# Patient Record
Sex: MALE | Race: WHITE | Employment: OTHER | URBAN - METROPOLITAN AREA
[De-identification: names, ages, dates, MRNs, and addresses within clinical notes are randomized per-mention and may not be internally consistent; named-entity substitution may affect disease eponyms.]

---

## 2017-07-03 ENCOUNTER — OFFICE VISIT (OUTPATIENT)
Dept: FAMILY MEDICINE CLINIC | Age: 57
End: 2017-07-03

## 2017-07-03 VITALS
HEART RATE: 62 BPM | OXYGEN SATURATION: 98 % | WEIGHT: 247 LBS | DIASTOLIC BLOOD PRESSURE: 82 MMHG | BODY MASS INDEX: 37.01 KG/M2 | SYSTOLIC BLOOD PRESSURE: 134 MMHG

## 2017-07-03 DIAGNOSIS — S90.222A SUBUNGUAL HEMATOMA OF FOOT, LEFT, INITIAL ENCOUNTER: Primary | ICD-10-CM

## 2017-07-03 PROCEDURE — 99212 OFFICE O/P EST SF 10 MIN: CPT | Performed by: FAMILY MEDICINE

## 2017-09-14 ENCOUNTER — TELEPHONE (OUTPATIENT)
Dept: FAMILY MEDICINE CLINIC | Age: 57
End: 2017-09-14

## 2017-09-14 DIAGNOSIS — Z00.00 EXAMINATION, MEDICAL, GENERAL: ICD-10-CM

## 2017-09-14 DIAGNOSIS — E78.5 HYPERLIPIDEMIA, UNSPECIFIED HYPERLIPIDEMIA TYPE: ICD-10-CM

## 2017-09-14 DIAGNOSIS — E03.9 HYPOTHYROIDISM, UNSPECIFIED TYPE: Primary | ICD-10-CM

## 2017-09-14 DIAGNOSIS — Z12.11 SCREEN FOR COLON CANCER: ICD-10-CM

## 2017-09-21 LAB
A/G RATIO: 1.1 (ref 1–2)
ALBUMIN SERPL-MCNC: 7.2 G/DL (ref 6.4–8.2)
ALBUMIN SERUM: 3.7 G/DL (ref 3.4–5)
ALP BLD-CCNC: 79 U/L (ref 45–117)
ALT SERPL-CCNC: 27 U/L (ref 12–78)
ANION GAP SERPL CALCULATED.3IONS-SCNC: 5 MMOL/L (ref 7–16)
AST SERPL-CCNC: 15 U/L (ref 15–37)
BILIRUBIN: 0.5 MG/DL (ref 0.2–1)
BUN BLDV-MCNC: 12 MG/DL (ref 7–18)
CALCIUM SERPL-MCNC: 8.9 MG/DL (ref 8.5–10.1)
CHLORIDE BLD-SCNC: 106 MMOL/L (ref 98–107)
CHOLESTEROL, STONE: 200 MG/DL
CO2: 26 MMOL/L (ref 21–32)
CREATININE + EGFR PANEL: 1.2 MG/DL (ref 0.6–1.3)
GFR AFRICAN AMERICAN: > 60 ML/MIN/1.73M2
GFR NON-AFRICAN AMERICAN: > 60 ML/MIN/1.73M2
GLOBULIN: 3.5 G/DL (ref 2.6–4.2)
GLUCOSE: 85 MG/DL (ref 74–106)
HDLC SERPL-MCNC: 45 MG/DL (ref 40–60)
LDL CHOLESTEROL CALCULATED: 120 MG/DL (ref 0–99)
POTASSIUM SERPL-SCNC: 3.7 MMOL/L (ref 3.5–5.1)
PROSTATE SPECIFIC ANTIGEN PERCENT FREE: 5.41 NG/ML (ref 0.01–4)
SODIUM BLD-SCNC: 137 MMOL/L (ref 136–145)
TRIGL SERPL-MCNC: 176 MG/DL (ref 0–149)
TSH SERPL DL<=0.05 MIU/L-ACNC: 1.67 UIU/ML (ref 0.36–3.74)
VLDLC SERPL CALC-MCNC: 35 MG/DL (ref 0–40)

## 2017-09-25 RX ORDER — LEVOTHYROXINE SODIUM 112 UG/1
TABLET ORAL
Qty: 30 TABLET | Refills: 0 | Status: SHIPPED | OUTPATIENT
Start: 2017-09-25 | End: 2017-10-03 | Stop reason: SDUPTHER

## 2017-10-03 ENCOUNTER — OFFICE VISIT (OUTPATIENT)
Dept: FAMILY MEDICINE CLINIC | Age: 57
End: 2017-10-03

## 2017-10-03 VITALS
SYSTOLIC BLOOD PRESSURE: 120 MMHG | WEIGHT: 249 LBS | DIASTOLIC BLOOD PRESSURE: 88 MMHG | OXYGEN SATURATION: 98 % | HEART RATE: 67 BPM | BODY MASS INDEX: 36.88 KG/M2 | HEIGHT: 69 IN

## 2017-10-03 DIAGNOSIS — E78.5 HYPERLIPIDEMIA, UNSPECIFIED HYPERLIPIDEMIA TYPE: ICD-10-CM

## 2017-10-03 DIAGNOSIS — Z23 NEED FOR INFLUENZA VACCINATION: ICD-10-CM

## 2017-10-03 DIAGNOSIS — K21.9 GASTROESOPHAGEAL REFLUX DISEASE WITHOUT ESOPHAGITIS: ICD-10-CM

## 2017-10-03 DIAGNOSIS — N40.1 BENIGN PROSTATIC HYPERPLASIA WITH LOWER URINARY TRACT SYMPTOMS, UNSPECIFIED MORPHOLOGY: ICD-10-CM

## 2017-10-03 DIAGNOSIS — R97.20 ELEVATED PSA: ICD-10-CM

## 2017-10-03 DIAGNOSIS — Z00.00 WELL ADULT EXAM: Primary | ICD-10-CM

## 2017-10-03 DIAGNOSIS — E03.9 HYPOTHYROIDISM, UNSPECIFIED TYPE: ICD-10-CM

## 2017-10-03 PROCEDURE — 90630 INFLUENZA, QUADV, 18-64 YRS, ID, PF, MICRO INJ, 0.1ML (FLUZONE QUADV, PF): CPT | Performed by: FAMILY MEDICINE

## 2017-10-03 PROCEDURE — 90471 IMMUNIZATION ADMIN: CPT | Performed by: FAMILY MEDICINE

## 2017-10-03 PROCEDURE — 99396 PREV VISIT EST AGE 40-64: CPT | Performed by: FAMILY MEDICINE

## 2017-10-03 RX ORDER — FLUTICASONE PROPIONATE 50 MCG
2 SPRAY, SUSPENSION (ML) NASAL DAILY
Qty: 3 BOTTLE | Refills: 3 | Status: SHIPPED | OUTPATIENT
Start: 2017-10-03 | End: 2018-10-18 | Stop reason: SDUPTHER

## 2017-10-03 RX ORDER — PANTOPRAZOLE SODIUM 40 MG/1
40 TABLET, DELAYED RELEASE ORAL DAILY
Qty: 90 TABLET | Refills: 3 | Status: SHIPPED | OUTPATIENT
Start: 2017-10-03 | End: 2018-10-18 | Stop reason: SDUPTHER

## 2017-10-03 RX ORDER — LEVOTHYROXINE SODIUM 112 UG/1
TABLET ORAL
Qty: 90 TABLET | Refills: 3 | Status: SHIPPED | OUTPATIENT
Start: 2017-10-03 | End: 2018-09-25 | Stop reason: SDUPTHER

## 2017-10-03 RX ORDER — ATORVASTATIN CALCIUM 20 MG/1
20 TABLET, FILM COATED ORAL DAILY
Qty: 30 TABLET | Refills: 12 | Status: SHIPPED | OUTPATIENT
Start: 2017-10-03 | End: 2018-10-18 | Stop reason: SDUPTHER

## 2017-10-03 ASSESSMENT — PATIENT HEALTH QUESTIONNAIRE - PHQ9
SUM OF ALL RESPONSES TO PHQ QUESTIONS 1-9: 0
SUM OF ALL RESPONSES TO PHQ9 QUESTIONS 1 & 2: 0
2. FEELING DOWN, DEPRESSED OR HOPELESS: 0
1. LITTLE INTEREST OR PLEASURE IN DOING THINGS: 0

## 2017-10-03 NOTE — MR AVS SNAPSHOT
After Visit Summary             Brenton Hopper   10/3/2017 10:40 AM   Office Visit    Description:  Male : 1960   Provider:  Fabiola Metz MD   Department:  [place] [address] and Future Appointments         Below is a list of your follow-up and future appointments. This may not be a complete list as you may have made appointments directly with providers that we are not aware of or your providers may have made some for you. Please call your providers to confirm appointments. It is important to keep your appointments. Please bring your current insurance card, photo ID, co-pay, and all medication bottles to your appointment. If self-pay, payment is expected at the time of service. Your To-Do List     Follow-Up    Return in about 1 year (around 10/3/2018) for Well. Information from Your Visit        Department     Name Address Phone Fax    [address] [phone]      You Were Seen for:         Comments    Well adult exam   [[id number]]         Vital Signs     Blood Pressure Pulse Height Weight Oxygen Saturation Body Mass Index    120/88 (Site: Left Arm, Position: Sitting, Cuff Size: Large Adult) 79 5' 8.5\" (1.74 m) 249 lb (112.9 kg) 98% 37.31 kg/m2    Smoking Status                   Current Some Day Smoker           Additional Information about your Body Mass Index (BMI)           Your BMI as listed above is considered obese (30 or more). BMI is an estimate of body fat, calculated from your height and weight. The higher your BMI, the greater your risk of heart disease, high blood pressure, type 2 diabetes, stroke, gallstones, arthritis, sleep apnea, and certain cancers. BMI is not perfect. It may overestimate body fat in athletes and people who are more muscular.   Even a small weight loss (between 5 and 10 percent of your current weight) by decreasing your calorie intake and [address]  [phone], Christine Elijah.   Dr. [facility]  [address]  [facility], [address], [place], [address] [place] [address]  [phone]      Urology  [facility]  Dr. Sheryle Sorenson Dr. Angelique Bruins Dr. Susen Pimenta Dr. Della Sickles (female)  Dr. Meera Webber  [phone]                  Today's Medication Changes          These changes are accurate as of: 10/3/17 11:57 AM.  If you have any questions, ask your nurse or doctor. START taking these medications           atorvastatin 20 MG tablet   Commonly known as:  LIPITOR   Instructions: Take 1 tablet by mouth daily   Quantity:  30 tablet   Refills:  12   Started by:  Anthony Myers MD         CHANGE how you take these medications           levothyroxine 112 MCG tablet   Commonly known as:  SYNTHROID   Instructions:  TAKE ONE TABLET BY MOUTH DAILY   Quantity:  90 tablet   Refills:  3   What changed:  See the new instructions.    Changed by:  Anthony Myers MD            Where to Get Your Medications      These medications were sent to [address], [address] [place] [address] [phone]  [address] [place] [address] [id number] [place] [address],[address] [address]     Phone:  [phone]     atorvastatin 20 MG tablet    levothyroxine 112 MCG tablet         You can get these medications from any pharmacy     Bring a paper prescription for each of these medications     Cetirizine HCl 10 MG Caps    fluticasone 50 MCG/ACT nasal spray    pantoprazole 40 MG tablet               Your Current Medications Are              levothyroxine (SYNTHROID) 112 MCG tablet TAKE ONE TABLET BY MOUTH DAILY    pantoprazole (PROTONIX) 40 MG tablet Take 1 tablet by mouth daily    Cetirizine HCl (ZYRTEC ALLERGY) 10 MG CAPS Take 10 mg by mouth daily as needed (allergies)    fluticasone (FLONASE) 50 MCG/ACT nasal spray 2 sprays by Nasal route daily Both nostrils

## 2017-10-03 NOTE — PROGRESS NOTES
Subjective:      Patient ID: Edi Rhodes is a 65 y.o. male. HPI patient presents today for his annual physical.        Hasn't been taking protonix for a year. Started with heartburn sx again about a month after stopped it, eating more rolaids. Taking thyroid reg, no issues. Gets up 4 times a night to urinate. Some hesitency with stream. Not bothered by it. Has been taking more dayquil for allergies, was getting more HA but dayquil helped with the drainage and HA better as well. Patient's medications, allergies, past medical, surgical, social and family histories were reviewed and updated in the EHR as appropriate. Not exercising as much as basement tore up but he is refinishing it so staying active there. UTD with dental and eye. Review of Systems    Objective:   Physical Exam      Body mass index is 37.31 kg/(m^2). Vitals:    10/03/17 1040   BP: 120/88   Site: Left Arm   Position: Sitting   Cuff Size: Large Adult   Pulse: 67   SpO2: 98%   Weight: 249 lb (112.9 kg)   Height: 5' 8.5\" (1.74 m)     Wt Readings from Last 3 Encounters:   10/03/17 249 lb (112.9 kg)   07/03/17 247 lb (112 kg)   09/01/16 231 lb (104.8 kg)       GENERAL:Alert and oriented x 4 NAD, obese, well hydrated, well developed. NECK:supple and non tender without mass, no thyromegaly or thyroid nodules, no cervical lymphadenopathy  LUNG:clear to auscultation bilaterally with normal respiratory effort  CV: Normal heart sounds, regular rate and rhythm without murmurs  EXTREMETY: no loss of hair, no edema, normal pedal pulses bilaterally    PHQ-9 Total Score: 0 (10/3/2017 10:44 AM)      Assessment:         Juan Pablo Storm was seen today for annual exam.    Diagnoses and all orders for this visit:    Well adult exam  Healthy diet, importance of reg exercise discussed    Hypothyroidism, unspecified type  -Stable, continue current medications.     Hyperlipidemia, unspecified hyperlipidemia type  Start lipitor  10 year CV risk 10.8%,

## 2018-09-25 ENCOUNTER — TELEPHONE (OUTPATIENT)
Dept: FAMILY MEDICINE CLINIC | Age: 58
End: 2018-09-25

## 2018-09-25 DIAGNOSIS — Z51.81 THERAPEUTIC DRUG MONITORING: ICD-10-CM

## 2018-09-25 DIAGNOSIS — E78.5 HYPERLIPIDEMIA, UNSPECIFIED HYPERLIPIDEMIA TYPE: ICD-10-CM

## 2018-09-25 DIAGNOSIS — E03.9 HYPOTHYROIDISM, UNSPECIFIED TYPE: Primary | ICD-10-CM

## 2018-09-25 RX ORDER — LEVOTHYROXINE SODIUM 112 UG/1
TABLET ORAL
Qty: 90 TABLET | Refills: 0 | Status: SHIPPED | OUTPATIENT
Start: 2018-09-25 | End: 2018-10-18 | Stop reason: SDUPTHER

## 2018-10-18 ENCOUNTER — OFFICE VISIT (OUTPATIENT)
Dept: FAMILY MEDICINE CLINIC | Age: 58
End: 2018-10-18
Payer: COMMERCIAL

## 2018-10-18 VITALS
HEIGHT: 68 IN | OXYGEN SATURATION: 97 % | DIASTOLIC BLOOD PRESSURE: 80 MMHG | WEIGHT: 251 LBS | HEART RATE: 60 BPM | BODY MASS INDEX: 38.04 KG/M2 | SYSTOLIC BLOOD PRESSURE: 118 MMHG

## 2018-10-18 DIAGNOSIS — E78.5 HYPERLIPIDEMIA, UNSPECIFIED HYPERLIPIDEMIA TYPE: ICD-10-CM

## 2018-10-18 DIAGNOSIS — E03.9 HYPOTHYROIDISM, UNSPECIFIED TYPE: ICD-10-CM

## 2018-10-18 DIAGNOSIS — R97.20 ELEVATED PSA: ICD-10-CM

## 2018-10-18 DIAGNOSIS — K63.5 POLYP OF COLON, UNSPECIFIED PART OF COLON, UNSPECIFIED TYPE: ICD-10-CM

## 2018-10-18 DIAGNOSIS — K21.9 GASTROESOPHAGEAL REFLUX DISEASE WITHOUT ESOPHAGITIS: ICD-10-CM

## 2018-10-18 DIAGNOSIS — Z00.00 WELL ADULT EXAM: Primary | ICD-10-CM

## 2018-10-18 PROCEDURE — 99396 PREV VISIT EST AGE 40-64: CPT | Performed by: FAMILY MEDICINE

## 2018-10-18 RX ORDER — PANTOPRAZOLE SODIUM 40 MG/1
40 TABLET, DELAYED RELEASE ORAL DAILY
Qty: 90 TABLET | Refills: 3 | Status: SHIPPED | OUTPATIENT
Start: 2018-10-18 | End: 2019-10-31 | Stop reason: SDUPTHER

## 2018-10-18 RX ORDER — LEVOTHYROXINE SODIUM 112 UG/1
TABLET ORAL
Qty: 90 TABLET | Refills: 3 | Status: SHIPPED | OUTPATIENT
Start: 2018-10-18 | End: 2019-10-31 | Stop reason: SDUPTHER

## 2018-10-18 RX ORDER — ATORVASTATIN CALCIUM 20 MG/1
20 TABLET, FILM COATED ORAL DAILY
Qty: 90 TABLET | Refills: 3 | Status: SHIPPED | OUTPATIENT
Start: 2018-10-18 | End: 2018-11-26

## 2018-10-18 RX ORDER — FLUTICASONE PROPIONATE 50 MCG
2 SPRAY, SUSPENSION (ML) NASAL DAILY
Qty: 3 BOTTLE | Refills: 3 | Status: SHIPPED | OUTPATIENT
Start: 2018-10-18 | End: 2019-10-31 | Stop reason: CLARIF

## 2018-10-18 ASSESSMENT — PATIENT HEALTH QUESTIONNAIRE - PHQ9
SUM OF ALL RESPONSES TO PHQ9 QUESTIONS 1 & 2: 0
SUM OF ALL RESPONSES TO PHQ QUESTIONS 1-9: 0
1. LITTLE INTEREST OR PLEASURE IN DOING THINGS: 0
SUM OF ALL RESPONSES TO PHQ QUESTIONS 1-9: 0
2. FEELING DOWN, DEPRESSED OR HOPELESS: 0

## 2018-10-18 NOTE — PATIENT INSTRUCTIONS
Call and schedule your colonoscopy  Dr. Salcido Offer  [phone]      Patient Education        Learning About Low-Carbohydrate Diets for Weight Loss  What is a low-carbohydrate diet? Low-carb diets avoid foods that are high in carbohydrate. These high-carb foods include pasta, bread, rice, cereal, fruits, and starchy vegetables. Instead, these diets usually have you eat foods that are high in fat and protein. Many people lose weight quickly on a low-carb diet. But the early weight loss is water. People on this diet often gain the weight back after they start eating carbs again. Not all diet plans are safe or work well. A lot of the evidence shows that low-carb diets aren't healthy. That's because these diets often don't include healthy foods like fruits and vegetables. Losing weight safely means balancing protein, fat, and carbs with every meal and snack. And low-carb diets don't always provide the vitamins, minerals, and fiber you need. If you have a serious medical condition, talk to your doctor before you try any diet. These conditions include kidney disease, heart disease, type 2 diabetes, high cholesterol, and high blood pressure. If you are pregnant, it may not be safe for your baby if you are on a low-carb diet. How can you lose weight safely? You might have heard that a diet plan helped another person lose weight. But that doesn't mean that it will work for you. It is very hard to stay on a diet that includes lots of big changes in your eating habits. If you want to get to a healthy weight and stay there, making healthy lifestyle changes will often work better than dieting. These steps can help. · Make a plan for change. Work with your doctor to create a plan that is right for you. · See a dietitian. He or she can show you how to make healthy changes in your eating habits. · Manage stress.  If you have a lot of stress in your life, it can be hard to focus on making healthy changes to your daily

## 2018-10-23 LAB — PROSTATE SPECIFIC ANTIGEN PERCENT FREE: 6.16 NG/ML (ref 0.01–4)

## 2018-10-24 ENCOUNTER — TELEPHONE (OUTPATIENT)
Dept: FAMILY MEDICINE CLINIC | Age: 58
End: 2018-10-24

## 2018-10-25 ENCOUNTER — TELEPHONE (OUTPATIENT)
Dept: FAMILY MEDICINE CLINIC | Age: 58
End: 2018-10-25

## 2018-10-25 DIAGNOSIS — R97.20 ELEVATED PSA: Primary | ICD-10-CM

## 2018-10-25 NOTE — TELEPHONE ENCOUNTER
Patient already has an appointment with Dr. Raffy Begum, referral has been placed in [facility] and faxed to [facility].

## 2018-11-26 RX ORDER — ATORVASTATIN CALCIUM 20 MG/1
TABLET, FILM COATED ORAL
Qty: 90 TABLET | Refills: 1 | Status: SHIPPED | OUTPATIENT
Start: 2018-11-26 | End: 2019-10-31 | Stop reason: CLARIF

## 2018-12-14 ENCOUNTER — OFFICE VISIT (OUTPATIENT)
Dept: FAMILY MEDICINE CLINIC | Age: 58
End: 2018-12-14
Payer: COMMERCIAL

## 2018-12-14 VITALS
TEMPERATURE: 98.2 F | SYSTOLIC BLOOD PRESSURE: 127 MMHG | OXYGEN SATURATION: 98 % | HEART RATE: 62 BPM | WEIGHT: 248 LBS | DIASTOLIC BLOOD PRESSURE: 82 MMHG | BODY MASS INDEX: 37.43 KG/M2

## 2018-12-14 DIAGNOSIS — R05.9 COUGH: Primary | ICD-10-CM

## 2018-12-14 PROCEDURE — 99213 OFFICE O/P EST LOW 20 MIN: CPT | Performed by: FAMILY MEDICINE

## 2018-12-14 ASSESSMENT — ENCOUNTER SYMPTOMS: COUGH: 1

## 2018-12-22 RX ORDER — LEVOTHYROXINE SODIUM 112 UG/1
TABLET ORAL
Qty: 90 TABLET | Refills: 0 | OUTPATIENT
Start: 2018-12-22

## 2019-10-14 ENCOUNTER — TELEPHONE (OUTPATIENT)
Dept: FAMILY MEDICINE CLINIC | Age: 59
End: 2019-10-14

## 2019-10-14 DIAGNOSIS — E03.9 HYPOTHYROIDISM, UNSPECIFIED TYPE: ICD-10-CM

## 2019-10-14 DIAGNOSIS — E78.5 HYPERLIPIDEMIA, UNSPECIFIED HYPERLIPIDEMIA TYPE: Primary | ICD-10-CM

## 2019-10-22 DIAGNOSIS — R97.20 ELEVATED PROSTATE SPECIFIC ANTIGEN (PSA): Primary | ICD-10-CM

## 2019-10-24 LAB
A/G RATIO: 1.5 (CALC) (ref 1–2.5)
ALBUMIN SERPL-MCNC: 4.1 G/DL (ref 3.6–5.1)
ALP BLD-CCNC: 91 U/L (ref 40–115)
ALT SERPL-CCNC: 13 U/L (ref 9–46)
AST SERPL-CCNC: 16 U/L (ref 10–35)
BILIRUB SERPL-MCNC: 0.5 MG/DL (ref 0.2–1.2)
BUN / CREAT RATIO: NORMAL (CALC) (ref 6–22)
BUN BLDV-MCNC: 11 MG/DL (ref 7–25)
CALCIUM SERPL-MCNC: 9.5 MG/DL (ref 8.6–10.3)
CHLORIDE BLD-SCNC: 103 MMOL/L (ref 98–110)
CHOLESTEROL, TOTAL: 213 MG/DL
CHOLESTEROL/HDL RATIO: 5.1 (CALC)
CHOLESTEROL: 171 MG/DL (CALC)
CO2: 29 MMOL/L (ref 20–32)
CREAT SERPL-MCNC: 1.25 MG/DL (ref 0.7–1.33)
GFR AFRICAN AMERICAN: 73 ML/MIN/1.73M2
GFR, ESTIMATED: 63 ML/MIN/1.73M2
GLOBULIN: 2.7 G/DL (CALC) (ref 1.9–3.7)
GLUCOSE BLD-MCNC: 85 MG/DL (ref 65–99)
HDLC SERPL-MCNC: 42 MG/DL
LDL CHOLESTEROL CALCULATED: 136 MG/DL (CALC)
POTASSIUM SERPL-SCNC: 4.8 MMOL/L (ref 3.5–5.3)
PROSTATE SPECIFIC ANTIGEN: 5.8 NG/ML
SODIUM BLD-SCNC: 138 MMOL/L (ref 135–146)
TOTAL PROTEIN: 6.8 G/DL (ref 6.1–8.1)
TRIGL SERPL-MCNC: 210 MG/DL
TSH ULTRASENSITIVE: 2.79 MIU/L (ref 0.4–4.5)

## 2019-10-31 ENCOUNTER — OFFICE VISIT (OUTPATIENT)
Dept: FAMILY MEDICINE CLINIC | Age: 59
End: 2019-10-31
Payer: OTHER GOVERNMENT

## 2019-10-31 ENCOUNTER — TELEPHONE (OUTPATIENT)
Dept: FAMILY MEDICINE CLINIC | Age: 59
End: 2019-10-31

## 2019-10-31 VITALS
DIASTOLIC BLOOD PRESSURE: 82 MMHG | HEIGHT: 69 IN | HEART RATE: 60 BPM | OXYGEN SATURATION: 98 % | BODY MASS INDEX: 38.36 KG/M2 | WEIGHT: 259 LBS | SYSTOLIC BLOOD PRESSURE: 128 MMHG

## 2019-10-31 DIAGNOSIS — G43.709 CHRONIC MIGRAINE WITHOUT AURA WITHOUT STATUS MIGRAINOSUS, NOT INTRACTABLE: ICD-10-CM

## 2019-10-31 DIAGNOSIS — K21.9 GASTROESOPHAGEAL REFLUX DISEASE WITHOUT ESOPHAGITIS: ICD-10-CM

## 2019-10-31 DIAGNOSIS — E78.5 HYPERLIPIDEMIA, UNSPECIFIED HYPERLIPIDEMIA TYPE: ICD-10-CM

## 2019-10-31 DIAGNOSIS — E03.9 HYPOTHYROIDISM, UNSPECIFIED TYPE: ICD-10-CM

## 2019-10-31 DIAGNOSIS — N40.1 BENIGN PROSTATIC HYPERPLASIA WITH LOWER URINARY TRACT SYMPTOMS, SYMPTOM DETAILS UNSPECIFIED: ICD-10-CM

## 2019-10-31 DIAGNOSIS — E66.09 CLASS 2 OBESITY DUE TO EXCESS CALORIES WITHOUT SERIOUS COMORBIDITY WITH BODY MASS INDEX (BMI) OF 38.0 TO 38.9 IN ADULT: ICD-10-CM

## 2019-10-31 DIAGNOSIS — Z00.00 WELL ADULT EXAM: Primary | ICD-10-CM

## 2019-10-31 PROCEDURE — 99386 PREV VISIT NEW AGE 40-64: CPT | Performed by: FAMILY MEDICINE

## 2019-10-31 RX ORDER — LEVOTHYROXINE SODIUM 112 UG/1
TABLET ORAL
Qty: 90 TABLET | Refills: 3 | Status: SHIPPED | OUTPATIENT
Start: 2019-10-31 | End: 2020-10-14 | Stop reason: SDUPTHER

## 2019-10-31 RX ORDER — PANTOPRAZOLE SODIUM 40 MG/1
40 TABLET, DELAYED RELEASE ORAL DAILY
Qty: 90 TABLET | Refills: 3 | Status: SHIPPED | OUTPATIENT
Start: 2019-10-31 | End: 2020-10-14 | Stop reason: SDUPTHER

## 2019-10-31 RX ORDER — FLUTICASONE PROPIONATE 50 MCG
2 SPRAY, SUSPENSION (ML) NASAL DAILY
Qty: 3 BOTTLE | Refills: 3 | Status: SHIPPED | OUTPATIENT
Start: 2019-10-31 | End: 2020-10-14 | Stop reason: SDUPTHER

## 2019-10-31 RX ORDER — ROSUVASTATIN CALCIUM 10 MG/1
10 TABLET, COATED ORAL NIGHTLY
Qty: 90 TABLET | Refills: 3 | Status: SHIPPED | OUTPATIENT
Start: 2019-10-31 | End: 2020-10-14 | Stop reason: SDUPTHER

## 2019-10-31 ASSESSMENT — PATIENT HEALTH QUESTIONNAIRE - PHQ9
SUM OF ALL RESPONSES TO PHQ9 QUESTIONS 1 & 2: 0
SUM OF ALL RESPONSES TO PHQ QUESTIONS 1-9: 0
SUM OF ALL RESPONSES TO PHQ QUESTIONS 1-9: 0
1. LITTLE INTEREST OR PLEASURE IN DOING THINGS: 0
2. FEELING DOWN, DEPRESSED OR HOPELESS: 0

## 2020-10-14 RX ORDER — FLUTICASONE PROPIONATE 50 MCG
2 SPRAY, SUSPENSION (ML) NASAL DAILY
Qty: 3 BOTTLE | Refills: 0 | Status: SHIPPED | OUTPATIENT
Start: 2020-10-14 | End: 2021-01-25 | Stop reason: SDUPTHER

## 2020-10-14 RX ORDER — LEVOTHYROXINE SODIUM 112 UG/1
TABLET ORAL
Qty: 90 TABLET | Refills: 0 | Status: SHIPPED | OUTPATIENT
Start: 2020-10-14 | End: 2020-12-16

## 2020-10-14 RX ORDER — PANTOPRAZOLE SODIUM 40 MG/1
40 TABLET, DELAYED RELEASE ORAL DAILY
Qty: 90 TABLET | Refills: 0 | Status: SHIPPED | OUTPATIENT
Start: 2020-10-14 | End: 2021-01-25 | Stop reason: SDUPTHER

## 2020-10-14 RX ORDER — CETIRIZINE HYDROCHLORIDE 10 MG/1
10 CAPSULE, LIQUID FILLED ORAL DAILY PRN
Qty: 90 CAPSULE | Refills: 0 | Status: SHIPPED | OUTPATIENT
Start: 2020-10-14 | End: 2021-01-25 | Stop reason: SDUPTHER

## 2020-10-14 RX ORDER — ROSUVASTATIN CALCIUM 10 MG/1
10 TABLET, COATED ORAL NIGHTLY
Qty: 90 TABLET | Refills: 0 | Status: SHIPPED | OUTPATIENT
Start: 2020-10-14 | End: 2021-01-25 | Stop reason: SDUPTHER

## 2020-10-14 NOTE — TELEPHONE ENCOUNTER
Medication:   Requested Prescriptions     Pending Prescriptions Disp Refills    Cetirizine HCl (ZYRTEC ALLERGY) 10 MG CAPS 90 capsule 3     Sig: Take 10 mg by mouth daily as needed (allergies)    pantoprazole (PROTONIX) 40 MG tablet 90 tablet 3     Sig: Take 1 tablet by mouth daily    levothyroxine (SYNTHROID) 112 MCG tablet 90 tablet 3     Sig: TAKE ONE TABLET BY MOUTH DAILY    fluticasone (FLONASE) 50 MCG/ACT nasal spray 3 Bottle 3     Si sprays by Nasal route daily Both Nostrils    rosuvastatin (CRESTOR) 10 MG tablet 90 tablet 3     Sig: Take 1 tablet by mouth nightly     Last Filled: 10/31/19    Patient Phone Number: [phone] (home)     Last appt: 10/31/2019   Next appt: Visit date not found    Last Lipid:   Lab Results   Component Value Date    CHOL 141 2020    CHOL 103 2020    TRIG 138 2020    HDL 38 2020    LDLCALC 79 2020       Last OARRS: No flowsheet data found.     KEVYN WASHINGTON [address], [address] [place] [address] [phone] - F [phone] (Phone)  [phone] (Fax)

## 2020-12-16 ENCOUNTER — VIRTUAL VISIT (OUTPATIENT)
Dept: FAMILY MEDICINE CLINIC | Age: 60
End: 2020-12-16
Payer: OTHER GOVERNMENT

## 2020-12-16 PROCEDURE — 99213 OFFICE O/P EST LOW 20 MIN: CPT | Performed by: FAMILY MEDICINE

## 2020-12-16 RX ORDER — LEVOTHYROXINE SODIUM 112 MCG
112 TABLET ORAL DAILY
COMMUNITY
End: 2021-01-25 | Stop reason: SDUPTHER

## 2020-12-16 NOTE — PROGRESS NOTES
Patient is being seen Virtually using Doxy. me. Patient is at home and Dr. Maria Isabel Olguin is at the home. The patient has consented to having a virtual visit due to the [place] 19 pandemic. Vitals are patient reported. Chief Complaint   Patient presents with    Nausea       Worried about gallbladder. States has been getting times where will just start feeling nauseous and has pain under right rib. Has thrown up once or twice, once after pizza and once after dinner with gravy. Sx are getting worse and now getting pretty much every time he eats. States has been going on for a year or so but now daily. No fever. No change in bowels. States lasts 10 minutes or so. If up and about will last longter but if can sit down will feel better. Patient's medications, allergies, past medical, surgical, social and family histories were reviewed and updated in the EHR as appropriate. Wt Readings from Last 3 Encounters:   10/31/19 259 lb (117.5 kg)   12/14/18 248 lb (112.5 kg)   10/18/18 251 lb (113.9 kg)     There is no height or weight on file to calculate BMI. PHQ Scores 10/31/2019 10/18/2018 10/3/2017   PHQ2 Score 0 0 0   PHQ9 Score 0 0 0     No flowsheet data found. GEN: Alert and oriented x 4 NAD, affect appropriate and obese, well developed. Points RUQ area as where it hurts      ASSESSMENT AND PLAN:       Micky Warren was seen today for nausea. Diagnoses and all orders for this visit:    RUQ pain  -     US GALLBLADDER RUQ; Future  -     Comprehensive Metabolic Panel;  Future  -     CBC Auto Differential; Future      If sx get sig worse or do not go away needs to go to ED Pursuant to the emergency declaration under the [address] [facility] waiver authority and the [employer] Act, this Virtual  Visit was conducted, with patient's consent, to reduce the patient's risk of exposure to COVID-19 and provide continuity of care for an established patient. Services were provided through a video synchronous discussion virtually to substitute for in-person clinic visit. Patient was instructed that the AVS is available on My Chart or was emailed to the patient if not on My Chart. Lab orders were emailed to patient if they do not use a [facility] lab. Any work notes were sent to patient through My Chart or email.

## 2020-12-16 NOTE — PATIENT INSTRUCTIONS
Patient Education        Learning About Acute Cholecystitis  What is cholecystitis? Cholecystitis (say \"koh-lih-sis-TY-tus\") is inflammation of the gallbladder. The gallbladder stores bile. Bile helps the body digest food. Normally, the bile flows from the gallbladder to the small intestine. A gallstone stuck in the cystic duct is most often the cause of sudden (acute) cholecystitis. The cystic duct is the tube that carries the bile out of the gallbladder. The gallstone blocks the bile from leaving the gallbladder. This results in an irritated and swollen gallbladder. The disease can also be caused by infection or trauma, such as an injury from a car accident. Cholecystitis has to be treated right away. You will probably have to go to the hospital. Surgery is the usual treatment. What are the symptoms? Symptoms include:  · Steady and severe pain in the upper right part of belly. This is the most common symptom. The pain can sometimes move to your back or right shoulder blade. It may last for more than 6 hours. · Nausea or vomiting. · A fever. How is it treated? The main way to treat this disease is surgery to remove the gallbladder. This surgery can often be done through small cuts (incisions) in the belly. This is called a laparoscopic cholecystectomy. In some cases, you may need a more extensive surgery. You may need surgery as soon as possible. The doctor may try to reduce swelling and irritation in the gallbladder before removing it. You may be given fluids and antibiotics through an IV. You may also be given pain medicine. Follow-up care is a key part of your treatment and safety. Be sure to make and go to all appointments, and call your doctor if you are having problems. It's also a good idea to know your test results and keep a list of the medicines you take. Where can you learn more? Go to [email or web address] and sign in to your [employer] account. Enter T940 in the [facility] box to learn more about \"Learning About Acute Cholecystitis. \"     If you do not have an account, please click on the \"Sign Up Now\" link. Current as of: April 15, 2020               Content Version: 12.6  © 1861-9300 [employer]. Care instructions adapted under license by [facility] ([facility]). If you have questions about a medical condition or this instruction, always ask your healthcare professional. [address] any warranty or liability for your use of this information.

## 2020-12-17 ENCOUNTER — TELEPHONE (OUTPATIENT)
Dept: FAMILY MEDICINE CLINIC | Age: 60
End: 2020-12-17

## 2020-12-17 NOTE — TELEPHONE ENCOUNTER
Patient needs referral for imaging and US to be faxed to [facility]. Fax# [phone]      Fax has been sent.     Please advise

## 2020-12-21 ENCOUNTER — TELEPHONE (OUTPATIENT)
Dept: FAMILY MEDICINE CLINIC | Age: 60
End: 2020-12-21

## 2020-12-21 NOTE — TELEPHONE ENCOUNTER
----- Message from Tito Vora MD sent at 12/21/2020 10:39 AM EST -----  U/s looks ok  rec refer GI for EGD  Dx: upper abd pain, nausea and vomiting

## 2020-12-31 ENCOUNTER — NURSE TRIAGE (OUTPATIENT)
Dept: OTHER | Facility: CLINIC | Age: 60
End: 2020-12-31

## 2020-12-31 NOTE — TELEPHONE ENCOUNTER
Vomiting a month ago. If he stands up and vomits. Having scope done on Monday. Wants nausea medication. Reason for Disposition   Request for URGENT new prescription or refill of 'essential' medication (i.e., likelihood of harm to patient if not taken) and triager unable to fill per department policy    Answer Assessment - Initial Assessment Questions  1. NAME of MEDICATION: \"What medicine are you calling about? \"        Nausea/vomiting medication    2. QUESTION: Elias Del Toro is your question? \"        Wants prescription for medication    3. PRESCRIBING HCP: \"Who prescribed it? \" Reason: if prescribed by specialist, call should be referred to that group. None    4. SYMPTOMS: \"Do you have any symptoms? \"        Vomiting    5. SEVERITY: If symptoms are present, ask \"Are they mild, moderate or severe? \"        moderate  6. PREGNANCY:  \"Is there any chance that you are pregnant? \" \"When was your last menstrual period? \"        na    Protocols used: MEDICATION QUESTION CALL-ADULT-[place]    Caller provided care advice and instructed to call back with worsening symptoms. Attention Provider: Thank you for allowing me to participate in the care of your patient. The patient was connected to triage in response to information provided to the [facility]. Please do not respond through this encounter as the response is not directed to a shared pool.        Warm transfer to [facility] at [facility] ([facility]) to get a hold of on call MD for Rx

## 2021-01-04 ENCOUNTER — TELEPHONE (OUTPATIENT)
Dept: FAMILY MEDICINE CLINIC | Age: 61
End: 2021-01-04

## 2021-01-04 NOTE — TELEPHONE ENCOUNTER
Kenya Gomez is calling from Dr. Nuha Jordan office [phone] ext [phone] is needing a referral for  the patient. Patient has [facility] .     They are not seeing the referral in

## 2021-01-05 NOTE — TELEPHONE ENCOUNTER
Referral was completed and faxed to [address] [place] [address] for 3 visits starting 1/6/2021 and authorization #[id number].

## 2021-01-15 ENCOUNTER — TELEPHONE (OUTPATIENT)
Dept: FAMILY MEDICINE CLINIC | Age: 61
End: 2021-01-15

## 2021-01-15 ENCOUNTER — HOSPITAL ENCOUNTER (OUTPATIENT)
Dept: CT IMAGING | Age: 61
Discharge: HOME | End: 2021-01-15
Payer: OTHER GOVERNMENT

## 2021-01-15 DIAGNOSIS — R10.9 ABDOMINAL PAIN, UNSPECIFIED ABDOMINAL LOCATION: ICD-10-CM

## 2021-01-15 PROCEDURE — 6360000004 HC RX CONTRAST MEDICATION: Performed by: INTERNAL MEDICINE

## 2021-01-15 PROCEDURE — 74177 CT ABD & PELVIS W/CONTRAST: CPT

## 2021-01-15 RX ADMIN — IOHEXOL 50 ML: 240 INJECTION, SOLUTION INTRATHECAL; INTRAVASCULAR; INTRAVENOUS; ORAL at 08:49

## 2021-01-15 RX ADMIN — IOPAMIDOL 75 ML: 755 INJECTION, SOLUTION INTRAVENOUS at 08:49

## 2021-01-15 NOTE — TELEPHONE ENCOUNTER
Neurologists    Dr. Joey Álvarez  [address]  [facility]  [address]  [facility]. [address]  Phone: [phone] [facility]rology  [phone]    Dr. Alan Palomo . [address]    Dr. Darby Muñoz - Migraine Headaches  Clinical Office:   [address] [place] [address] Road  Phone: [phone]      [address] [place])  [address] [place] [address] [facility], [address]   ? Phone: [phone]

## 2021-01-15 NOTE — TELEPHONE ENCOUNTER
Patient states he went to [address], and they state patient may need neurologist. Patient is requesting a referral for neurologist.      Please advise

## 2021-01-22 ENCOUNTER — TELEPHONE (OUTPATIENT)
Dept: FAMILY MEDICINE CLINIC | Age: 61
End: 2021-01-22

## 2021-01-22 ENCOUNTER — HOSPITAL ENCOUNTER (OUTPATIENT)
Dept: CT IMAGING | Age: 61
Discharge: HOME | End: 2021-01-22
Payer: OTHER GOVERNMENT

## 2021-01-22 DIAGNOSIS — R11.2 NAUSEA AND VOMITING, INTRACTABILITY OF VOMITING NOT SPECIFIED, UNSPECIFIED VOMITING TYPE: ICD-10-CM

## 2021-01-22 PROCEDURE — 70470 CT HEAD/BRAIN W/O & W/DYE: CPT

## 2021-01-22 PROCEDURE — 6360000004 HC RX CONTRAST MEDICATION: Performed by: FAMILY MEDICINE

## 2021-01-22 RX ADMIN — IOPAMIDOL 75 ML: 755 INJECTION, SOLUTION INTRAVENOUS at 10:26

## 2021-01-22 NOTE — TELEPHONE ENCOUNTER
Patient wanted to let Dr Geoff Biggs know he went to Dr Ren Carpenter, he was evaluated and he referred patient back to Dr Geoff Biggs. Patient wants the migraine medicine now, he would lie to try it for 30 days and then follow back up with Dr Geoff Biggs.     Please send to     [facility] [address], [address]      Please advise

## 2021-01-25 ENCOUNTER — OFFICE VISIT (OUTPATIENT)
Dept: FAMILY MEDICINE CLINIC | Age: 61
End: 2021-01-25
Payer: OTHER GOVERNMENT

## 2021-01-25 VITALS
DIASTOLIC BLOOD PRESSURE: 88 MMHG | HEART RATE: 62 BPM | BODY MASS INDEX: 37.04 KG/M2 | OXYGEN SATURATION: 98 % | TEMPERATURE: 97.3 F | WEIGHT: 249 LBS | SYSTOLIC BLOOD PRESSURE: 130 MMHG

## 2021-01-25 DIAGNOSIS — G43.709 CHRONIC MIGRAINE WITHOUT AURA WITHOUT STATUS MIGRAINOSUS, NOT INTRACTABLE: ICD-10-CM

## 2021-01-25 DIAGNOSIS — E78.5 HYPERLIPIDEMIA, UNSPECIFIED HYPERLIPIDEMIA TYPE: ICD-10-CM

## 2021-01-25 DIAGNOSIS — E03.9 HYPOTHYROIDISM, UNSPECIFIED TYPE: ICD-10-CM

## 2021-01-25 DIAGNOSIS — R11.2 NON-INTRACTABLE VOMITING WITH NAUSEA, UNSPECIFIED VOMITING TYPE: ICD-10-CM

## 2021-01-25 DIAGNOSIS — R42 VERTIGO: Primary | ICD-10-CM

## 2021-01-25 DIAGNOSIS — K21.9 GASTROESOPHAGEAL REFLUX DISEASE, UNSPECIFIED WHETHER ESOPHAGITIS PRESENT: ICD-10-CM

## 2021-01-25 PROCEDURE — 99214 OFFICE O/P EST MOD 30 MIN: CPT | Performed by: FAMILY MEDICINE

## 2021-01-25 RX ORDER — PANTOPRAZOLE SODIUM 40 MG/1
40 TABLET, DELAYED RELEASE ORAL DAILY
Qty: 90 TABLET | Refills: 3 | Status: SHIPPED | OUTPATIENT
Start: 2021-01-25 | End: 2021-05-26

## 2021-01-25 RX ORDER — FLUTICASONE PROPIONATE 50 MCG
2 SPRAY, SUSPENSION (ML) NASAL DAILY
Qty: 3 BOTTLE | Refills: 3 | Status: SHIPPED | OUTPATIENT
Start: 2021-01-25 | End: 2022-02-07 | Stop reason: SDUPTHER

## 2021-01-25 RX ORDER — CETIRIZINE HYDROCHLORIDE 10 MG/1
10 CAPSULE, LIQUID FILLED ORAL DAILY PRN
Qty: 90 CAPSULE | Refills: 3 | Status: SHIPPED | OUTPATIENT
Start: 2021-01-25 | End: 2022-02-07 | Stop reason: SDUPTHER

## 2021-01-25 RX ORDER — ROSUVASTATIN CALCIUM 10 MG/1
10 TABLET, COATED ORAL NIGHTLY
Qty: 90 TABLET | Refills: 3 | Status: SHIPPED | OUTPATIENT
Start: 2021-01-25 | End: 2021-05-26

## 2021-01-25 RX ORDER — MECLIZINE HYDROCHLORIDE 25 MG/1
25 TABLET ORAL 3 TIMES DAILY PRN
Qty: 90 TABLET | Refills: 3 | Status: SHIPPED | OUTPATIENT
Start: 2021-01-25 | End: 2021-07-01 | Stop reason: SDUPTHER

## 2021-01-25 RX ORDER — LEVOTHYROXINE SODIUM 112 MCG
112 TABLET ORAL DAILY
Qty: 90 TABLET | Refills: 3 | Status: SHIPPED | OUTPATIENT
Start: 2021-01-25 | End: 2022-02-07 | Stop reason: SDUPTHER

## 2021-01-25 ASSESSMENT — PATIENT HEALTH QUESTIONNAIRE - PHQ9
SUM OF ALL RESPONSES TO PHQ QUESTIONS 1-9: 2
SUM OF ALL RESPONSES TO PHQ QUESTIONS 1-9: 2

## 2021-01-25 NOTE — PROGRESS NOTES
PERRL, EOMI, no nystagmus but got nauseous with testing of EOM and had to close eyes and lie back until passed, said it \"made my stomach drop. \"   Normal UE and LE strength shoulder, elbow, , hip, knee and foot  Normal reflexes bilaterally knee and elbow  Normal sensation to light touch upper and lower extremities bilaterally  Normal FTN, ADEOLA normal, neg rhomberg but unsteady, neg pronator drift  Normal gait  Tandem gait very unsteady        ASSESSMENT AND PLAN:       Leeann Lorenzo was seen today for headache. Diagnoses and all orders for this visit:    Hypothyroidism, unspecified type  -     TSH with Reflex; Future    Hyperlipidemia, unspecified hyperlipidemia type  -     Lipid Panel; Future  -     Comprehensive Metabolic Panel; Future    Gastroesophageal reflux disease, unspecified whether esophagitis present  -     MAGNESIUM; Future    Vertigo  -     [address], Renata Christensen DO, Otolaryngology, [facility]    Non-intractable vomiting with nausea, unspecified vomiting type    Other orders  -     SYNTHROID 112 MCG tablet; Take 1 tablet by mouth Daily  -     pantoprazole (PROTONIX) 40 MG tablet; Take 1 tablet by mouth daily  -     fluticasone (FLONASE) 50 MCG/ACT nasal spray; 2 sprays by Nasal route daily Both Nostrils  -     rosuvastatin (CRESTOR) 10 MG tablet; Take 1 tablet by mouth nightly  -     Cetirizine HCl (ZYRTEC ALLERGY) 10 MG CAPS; Take 10 mg by mouth daily as needed (allergies)  -     meclizine (ANTIVERT) 25 MG tablet; Take 1 tablet by mouth 3 times daily as needed for Dizziness or Nausea      Discussed with pt this really sounds like a vestibular issue. Has migraines and sx may be making them worse but HA is not the main sx, the nausea is and seems precipitated by movement and better when eyes closed or lying back    GI workup is negative, spoke with Dr. Martell Munson on phone today and he feels after workup done (EGD, CT) that no GI issues. Discussed with pt needs to stop taking so much excedrin and not sure really is helping given he needs to take 4-5 times a day and can seriously harm hearing, GI and kidneys. Trial meclizine and see ENT for work up     RTO 1 month for APE and to regroup on this. Return in about 1 month (around 2/25/2021) for 30 min.

## 2021-01-28 ENCOUNTER — OFFICE VISIT (OUTPATIENT)
Dept: ENT CLINIC | Age: 61
End: 2021-01-28
Payer: OTHER GOVERNMENT

## 2021-01-28 VITALS — HEART RATE: 98 BPM | SYSTOLIC BLOOD PRESSURE: 144 MMHG | DIASTOLIC BLOOD PRESSURE: 76 MMHG | TEMPERATURE: 96.6 F

## 2021-01-28 DIAGNOSIS — R42 DISEQUILIBRIUM: Primary | ICD-10-CM

## 2021-01-28 DIAGNOSIS — R11.0 NAUSEA IN ADULT: ICD-10-CM

## 2021-01-28 PROCEDURE — 99203 OFFICE O/P NEW LOW 30 MIN: CPT | Performed by: OTOLARYNGOLOGY

## 2021-01-28 ASSESSMENT — ENCOUNTER SYMPTOMS
SORE THROAT: 0
RHINORRHEA: 0
SINUS PAIN: 0
VOICE CHANGE: 0
TROUBLE SWALLOWING: 0

## 2021-01-28 NOTE — PATIENT INSTRUCTIONS
ENG TESTING INSTRUCTIONS      The inner ear has two main components, one for hearing and one for equilibrium balance. In order to diagnose dizziness, we need to test both components. Toward that end, I have recommended an audiogram (hearing test) and ENG (electronystagmogram), or balance system testing. This testing will take approximately 1.5 to 2 hours. Please be in time as the audiologist runs on schedule him and your test will need to be rescheduled if you arrive after the scheduled starting time. Until your testing is completed and you have a follow up visit, please maintain these dizziness activity precautions:  1. Avoid working at [place], on ladders or scaffolding or near the edges of platforms or using heavy or complicated machinery or operating a motorized vehicle, or any activity where loss of consciousness or equilibrium would be hazardous to yourself or others, if you are experiencing dizziness, and until having been dizzy free for 72 hours. Even then, take appropriate care and precautions as dizziness could occur at any time. 2. Avoid any motions or activity that results in the off balance sensation. Stand up or arise slowly and in stages, as we discussed. YOU MUST NOT TAKE ANY OF THE FOLLOWING MEDICATIONS FOR 48 - 72 HOURS BEFORE YOUR TEST:  · Any medications that can make you drowsy or sleepy  · Allergy pills  · Sedative pills   · Tranquilizers/anti-anxiety medications (Valium, Librium, Xanax, Ativan, etc.)  · Sleeping pills   · Antihistamines/Decongestants (Benadryl, Sudafed, Dimetapp, Chlor-Trimeton)  · Pain pills/Narcotics/Barbiturates (codeine, Demerol, hydrocodone, Norco, Vicodin, oxycodone, Percocet, Percodan, OxyContin, tramadol, phenobarbital, antidepressants)      · Diet pills. · Nerve/muscle relaxant pills (Robaxin, Valium)  · Anti-dizziness pills.   (meclizine, Antivert, Bonine, ear patches, clonazepam/Klonopin, scopolamine/TRANSDERM-SCOP, etc.)

## 2021-01-28 NOTE — PROGRESS NOTES
[facility]       NEW PATIENT VISIT      PCP:  Brayden Horne MD      REFERRED BY:   Brayden Horne MD       CHIEF COMPLAINT:  Chief Complaint   Patient presents with    Dizziness       HISTORY OF PRESENT ILLNESS:       Edson Gamble is a 62 y.o. male here for evaluation and treatment of disequilibrium, with nausea. The severity is severe. The timing is constant since about December 8 weekend. Constant since onset on December 8. Modifying factors include meclizine. Associated symptoms include occasional headache, \"but I have migraine headaches. \"  symptoms when stand too long when driving or riding in a car, the car is my worse. No other precipitating factors have been noted. Since second week December movement causes nausea or if standing loo long a time. Feels off balance or equilibrium is off. Seems to be visual related. He denied true vertigo. Don't not feel room or self spinning or self moving. Don't feel world moving around him. No LOC. \"I have had it for year and a half randomly, while driving, mild and I could get through it . .. slowly worse past 4-5 months. I let my wife drive now. \"If I stand too long or do any physical, lifting, walking up steps, I get dizzy. Every time I moved but fine if sit in a recliner and stay still. He stated that he had an EGD endoscopy and \"my stomach is fine\" per Dr. Radha Simon. Meclizine helps for about 4.5 hours. REVIEW OF SYSTEMS:    Review of Systems   Constitutional: Negative for chills, fever and unexpected weight change. HENT: Positive for congestion (related to allergies). Negative for ear discharge, ear pain, hearing loss, nosebleeds, postnasal drip, rhinorrhea, sinus pain, sore throat, tinnitus, trouble swallowing and voice change.           PAST MEDICAL HISTORY:    Past Medical History:   Diagnosis Date    Esophageal reflux  Migraine, unspecified, without mention of intractable migraine without mention of status migrainosus     Other and unspecified hyperlipidemia     Unspecified hypothyroidism          Past Surgical History:   Procedure Laterality Date    CARDIAC CATHETERIZATION  01/2009    normal    VASECTOMY  1992           EXAMINATION:      Vitals:    01/28/21 1346   BP: (!) 144/76   Site: Right Lower Arm   Pulse: 98   Temp: 96.6 °F (35.9 °C)         Physical Exam  Vitals signs reviewed. Constitutional:       General: He is not in acute distress. Appearance: Normal appearance. He is well-developed. He is not ill-appearing or toxic-appearing. HENT:      Head: Normocephalic and atraumatic. Salivary Glands: Right salivary gland is not diffusely enlarged or tender. Left salivary gland is not diffusely enlarged or tender. Right Ear: Tympanic membrane, ear canal and external ear normal.      Left Ear: Tympanic membrane, ear canal and external ear normal.      Nose: No nasal deformity, septal deviation, mucosal edema, congestion or rhinorrhea. Right Turbinates: Not enlarged. Left Turbinates: Not enlarged. Right Sinus: No maxillary sinus tenderness or frontal sinus tenderness. Left Sinus: No maxillary sinus tenderness or frontal sinus tenderness. Mouth/Throat:      Lips: Pink. No lesions. Mouth: Mucous membranes are moist. No oral lesions. Tongue: No lesions. Palate: No mass and lesions. Pharynx: Oropharynx is clear. Uvula midline. No oropharyngeal exudate or posterior oropharyngeal erythema. Tonsils: No tonsillar exudate or tonsillar abscesses. Neck:      Musculoskeletal: Normal range of motion and neck supple. No neck rigidity or muscular tenderness. Thyroid: No thyroid mass, thyromegaly or thyroid tenderness. Lymphadenopathy:      Cervical: No cervical adenopathy. Neurological:      Mental Status: He is alert. IMPRESSION / Galva Baltazar / Moise Pena:       Sharona Starkey was seen today for dizziness. Diagnoses and all orders for this visit:    Disequilibrium    Nausea in adult         RECOMMENDATIONS/PLAN:      1. Audiogram and ENG at [facility]  2. Return for recheck/follow-up after audiogram and ENG testing.           MEDICAL DECISION MAKING    # and complexity of problems addressed:  93263 - Moderate  1 undiagnosed NEW problem with uncertain prognosis    Amount and/or Complexity of Data to be Reviewed and Analyzed  12357- low - 1 of 2 categories  Cat 1 - 2 of following:  Ordered 2  tests      Risk of Complications and /or Morbidity or Mortality of Patient Management  75296 - Low

## 2021-01-29 LAB
A/G RATIO: 1.6 (CALC) (ref 1–2.5)
ALBUMIN SERPL-MCNC: 4.1 G/DL (ref 3.6–5.1)
ALP BLD-CCNC: 91 U/L (ref 35–144)
ALT SERPL-CCNC: 21 U/L (ref 9–46)
AST SERPL-CCNC: 22 U/L (ref 10–35)
BILIRUB SERPL-MCNC: 0.4 MG/DL (ref 0.2–1.2)
BUN / CREAT RATIO: NORMAL (CALC) (ref 6–22)
BUN BLDV-MCNC: 11 MG/DL (ref 7–25)
CALCIUM SERPL-MCNC: 9.4 MG/DL (ref 8.6–10.3)
CHLORIDE BLD-SCNC: 104 MMOL/L (ref 98–110)
CHOLESTEROL, TOTAL: 145 MG/DL
CHOLESTEROL/HDL RATIO: 3.2 (CALC)
CO2: 28 MMOL/L (ref 20–32)
CREAT SERPL-MCNC: 1.22 MG/DL (ref 0.7–1.25)
GFR AFRICAN AMERICAN: 74 ML/MIN/1.73M2
GFR, ESTIMATED: 64 ML/MIN/1.73M2
GLOBULIN: 2.6 G/DL (CALC) (ref 1.9–3.7)
GLUCOSE BLD-MCNC: 84 MG/DL (ref 65–99)
HDLC SERPL-MCNC: 45 MG/DL
LDL CHOLESTEROL CALCULATED: 73 MG/DL (CALC)
MAGNESIUM: 2.1 MG/DL (ref 1.5–2.5)
NONHDLC SERPL-MCNC: 100 MG/DL (CALC)
POTASSIUM SERPL-SCNC: 4.4 MMOL/L (ref 3.5–5.3)
SODIUM BLD-SCNC: 139 MMOL/L (ref 135–146)
TOTAL PROTEIN: 6.7 G/DL (ref 6.1–8.1)
TRIGL SERPL-MCNC: 175 MG/DL
TSH ULTRASENSITIVE: 3.41 MIU/L (ref 0.4–4.5)

## 2021-03-04 ENCOUNTER — OFFICE VISIT (OUTPATIENT)
Dept: ENT CLINIC | Age: 61
End: 2021-03-04
Payer: OTHER GOVERNMENT

## 2021-03-04 VITALS
WEIGHT: 252 LBS | TEMPERATURE: 96.9 F | HEIGHT: 68 IN | HEART RATE: 72 BPM | BODY MASS INDEX: 38.19 KG/M2 | DIASTOLIC BLOOD PRESSURE: 83 MMHG | SYSTOLIC BLOOD PRESSURE: 142 MMHG

## 2021-03-04 DIAGNOSIS — H91.93 BILATERAL HEARING LOSS, UNSPECIFIED HEARING LOSS TYPE: ICD-10-CM

## 2021-03-04 DIAGNOSIS — R42 DISEQUILIBRIUM: ICD-10-CM

## 2021-03-04 DIAGNOSIS — H61.21 IMPACTED CERUMEN OF RIGHT EAR: ICD-10-CM

## 2021-03-04 PROCEDURE — 69210 REMOVE IMPACTED EAR WAX UNI: CPT | Performed by: OTOLARYNGOLOGY

## 2021-03-04 RX ORDER — MECLIZINE HCL 12.5 MG/1
12.5 TABLET ORAL 3 TIMES DAILY PRN
COMMUNITY
End: 2021-05-26 | Stop reason: SDUPTHER

## 2021-03-04 NOTE — PROGRESS NOTES
Chief Complaint   Patient presents with    Cerumen Impaction       HISTORY:    Ricardo Clark stated that the right ear is plugged with wax and hearing is decreased in the right ear. He is scheduled for ENG testing this coming Wednesday. EXAMINATION:    The right EAC was occluded by cerumen impaction, obscuring visualization of the right TM. The left TM and EAC were normal.       PROCEDURE - REMOVAL OF RIGHT CERUMEN IMPACTION: :   The cerumen impaction occluding the right EAC was removed, under otomicroscopy visualization, with instrumentation, using a Billeau wire loop. After successful cerumen removal, the right EAC appeared to be normal and clear without mass, exudate, or edema. The right tympanic membrane appeared to be normal. There was no evidence of acute disease. Left ear had minimal cerumen accumulation which was removed with a Billeau wire loop. Ricardo Clark reported improved hearing I the right ear, back to usual normal level, after cerumen removal.            IMPRESSION / DIAGNOSES / Mohit Brink / PROCEDURES:       Ricardo Clark was seen today for cerumen impaction. Diagnoses and all orders for this visit:    Impacted cerumen of right ear    Disequilibrium    Bilateral hearing loss, unspecified hearing loss type         RECOMMENDATIONS / PLAN:   1. Proceed with audiogram and ENG testing. 2. Return for recheck/follow-up after audiogram and ENG testing.

## 2021-03-11 ENCOUNTER — OFFICE VISIT (OUTPATIENT)
Dept: ENT CLINIC | Age: 61
End: 2021-03-11
Payer: OTHER GOVERNMENT

## 2021-03-11 VITALS — TEMPERATURE: 96.3 F | HEART RATE: 70 BPM | DIASTOLIC BLOOD PRESSURE: 87 MMHG | SYSTOLIC BLOOD PRESSURE: 143 MMHG

## 2021-03-11 DIAGNOSIS — G43.709 CHRONIC MIGRAINE WITHOUT AURA WITHOUT STATUS MIGRAINOSUS, NOT INTRACTABLE: Primary | ICD-10-CM

## 2021-03-11 DIAGNOSIS — R42 DISEQUILIBRIUM: Primary | ICD-10-CM

## 2021-03-11 DIAGNOSIS — H90.5 HIGH FREQUENCY SENSORINEURAL HEARING LOSS OF LEFT EAR: ICD-10-CM

## 2021-03-11 DIAGNOSIS — G43.109 VERTIGINOUS MIGRAINE: ICD-10-CM

## 2021-03-11 PROCEDURE — 99214 OFFICE O/P EST MOD 30 MIN: CPT | Performed by: OTOLARYNGOLOGY

## 2021-03-11 NOTE — PATIENT INSTRUCTIONS
· I recommend an MRI scan of the IACs/brain, with contrast, to rule out a vestibular schwannoma (\"acoustic neuroma\") or central nervous system disease as the reason for your unilateral or asymmetric sensorineural hearing loss, tinnitus, or dizziness. There are adverse consequences of untreated acoustic neuroma, i.e. total loss of hearing, paralysis of the face, permanent dizziness or pressure on the brain. This may occur suddenly due to bleeding into the tumor or sudden growth. Call the office for the results of the MRI scan 10 days after the test, if you have not been informed previously. · Avoid working or being at heights, on ladders or scaffolding or near the edges of platforms or using heavy or complicated machinery or operating a motorized vehicle if you are experiencing dizziness and until having been dizzy free for 72 hours. Even then, take appropriate care and precautions as dizziness could occur at any time. · Avoid any motions or activity that results in the off balance sensation. Stand up or arise slowly and in stages, as we discussed. · You should consider amplification therapy, hearing aid, if you are having difficulty communicating and/or hearing important sounds. · You should return for an annual hearing test to monitor your hearing, and whenever your hearing further decreases significantly. · You should return if your ears plug up with ear wax causing difficulty hearing or pressure. · You should employ the tinnitus masking techniques and strategies we discussed, as needed. Bedside tinnitus masking devices (eg. a white noise machine, noise machine, noise nerissa on your cell phone, fan on in the room, radio with light music or tuned between stations to white noise static) can be very helpful. · You should avoid exposure to excessively high levels of noise/sound and use hearing protection measures we discussed, as needed, if such exposure is unavoidable.   · Follow instruction below for vestibular exercises to attempt to reduce your dizziness. We will consider vestibular physical therapy if these exercises do not result in decreased dizziness sufficiently. · I recommend that you use Lipoflavonoid Plus, (use brand name, not generic, for the first six months), for treatment of your tinnitus and dizziness. This may be obtained \"over the counter\" at your pharmacy. You should take two orally three times a day for the first 60 days, then one orally three times a day thereafter. Take this medication continuously for at least six months. If you have seen no improvement in your tinnitus or dizziness after six months, you should consider cessation of this treatment. · NO Q-TIPS IN THE EARS  You should never clean your ears with a Q-tip, cotton tipped applicator, Radha pin, paper clip, pen cap, nail file, or any other instrument. I recommend only use of one the several ear wax removal kits available \"over the counter\" if you feel a need to try to remove ear wax. No other methods should be self used for this purpose as there is danger of injury to the ear and risk of irreparable and irreversible permanent hearing loss.

## 2021-03-11 NOTE — PROGRESS NOTES
[address] [place] [address] ENT       CHIEF COMPLAINT:  Chief Complaint   Patient presents with    Dizziness     review test results       HISTORY:        Gets nausea with dizziness, migraines but does not get true vertigo. EXAMINATION:    WDWN, in NAD      AUDIOGRAM:                  ENG (see full report for details): COUNSELING:    The audiogram results were reviewed and discussed with Radha Husbands, whom I advised of the type of hearing loss, the probable etiology, possible associated impairment and disability, need for noise protection and avoidance, possible benefits of hearing aids, or other amplification therapy. I discussed the etiology of tinnitus and treatment/coping measures including masking techniques, and need for annual and prn hearing tests. Patient was advised of difficulty understanding speech in the presence of moderate to high volume background noise. The ENG results were reviewed and discussed with Radha Husbands, whom I advised of the significance of the ENG findings. I discussed the functions of the vestibular system and the probable etiology and diagnosis, vestibular dysfunction and impairment and possible disability. I discussed vestibular exercises, and possible benefits of vestibular rehabilitation therapy. I discussed activity and safety precautions, fall prevention and avoidance, and need for continued follow-up. I discussed possible etiologies of dizziness including, but not limited to: Meniere's disease, vestibular neuronitis, acute labyrinthitis, idiopathic peripheral vestibular dysfunction, benign paroxysmal positional vertigo, autoimmune hearing loss and vestibular dysfunction, multiple sclerosis, vestibular schwannoma (acoustic neuroma), CNS neoplasm, and other CNS disease.     I discussed vestibular schwannoma (acoustic neuroma), signs, symptoms, possible progression with loss of hearing, dizziness, facial nerve paralysis and compression of brain the tinnitus masking techniques and strategies we discussed, as needed. Bedside tinnitus masking devices (eg. a white noise machine, noise machine, noise nerissa on your cell phone, fan on in the room, radio with light music or tuned between stations to white noise static) can be very helpful. · You should avoid exposure to excessively high levels of noise/sound and use hearing protection measures we discussed, as needed, if such exposure is unavoidable. · Follow instruction below for vestibular exercises to attempt to reduce your dizziness. We will consider vestibular physical therapy if these exercises do not result in decreased dizziness sufficiently. · I recommend that you use Lipoflavonoid Plus, (use brand name, not generic, for the first six months), for treatment of your tinnitus and dizziness. This may be obtained \"over the counter\" at your pharmacy. You should take two orally three times a day for the first 60 days, then one orally three times a day thereafter. Take this medication continuously for at least six months. If you have seen no improvement in your tinnitus or dizziness after six months, you should consider cessation of this treatment. · NO Q-TIPS IN THE EARS  You should never clean your ears with a Q-tip, cotton tipped applicator, Radha pin, paper clip, pen cap, nail file, or any other instrument. I recommend only use of one the several ear wax removal kits available \"over the counter\" if you feel a need to try to remove ear wax. No other methods should be self used for this purpose as there is danger of injury to the ear and risk of irreparable and irreversible permanent hearing loss. Follow-up  Return for any further ENT or sinus problems or symptoms.          MEDICAL DECISION MAKING    # and complexity of problems addressed:  69368 - Moderate  2 or more stable chronic illnesses    Amount and/or Complexity of Data to be Reviewed and Analyzed  68432 - moderate - 1 of 3 categories    Cat 1 - any 3 of following  Reviewed results of 2 unique tests   Ordered 1 test    Risk of Complications and /or Morbidity or Mortality of Patient Management  15390 - Low

## 2021-03-12 NOTE — TELEPHONE ENCOUNTER
Referral placed. Wife informed. Have to do referral through [facility] and then fax to specialist.      Referral is being reviewed. Await decision.

## 2021-03-15 RX ORDER — ONDANSETRON 4 MG/1
4 TABLET, FILM COATED ORAL 3 TIMES DAILY PRN
Qty: 20 TABLET | Refills: 0 | Status: SHIPPED | OUTPATIENT
Start: 2021-03-15 | End: 2021-03-26 | Stop reason: SDUPTHER

## 2021-03-15 NOTE — TELEPHONE ENCOUNTER
Was calling to inform wife that referral was approved and was informed from her that Dr. Brianna Paz isn't accepting new patients because she will not be in practice anymore. Who will you recommend now? Wife also would like a Rx for nausea to be sent to the pharmacy for the patient. He vomits every time he gets in the car.

## 2021-03-15 NOTE — TELEPHONE ENCOUNTER
Ok zofran 4mg TID prn nausea    Neurologists    Dr. Jessica Menjivar  [address] Neurology  [phone]    [address]    Dr. Jesu Herrera - Migraine Headaches  Clinical Office:   [address]  Phone: [phone]      [facility] Office [address]) [address] [address] [address], [address]  ? Phone: [phone]

## 2021-03-16 ENCOUNTER — TELEPHONE (OUTPATIENT)
Dept: FAMILY MEDICINE CLINIC | Age: 61
End: 2021-03-16

## 2021-03-16 NOTE — TELEPHONE ENCOUNTER
Referral to Dr. Venda Runner has been changed to Dr. Ajay Peres and approval was scanned to chart. Wife informed.

## 2021-03-16 NOTE — TELEPHONE ENCOUNTER
Patients wife called to provide a name and fax for a referral to a Neurologist    Dr Jerson Riley   Fax [phone]

## 2021-03-17 ENCOUNTER — TELEPHONE (OUTPATIENT)
Dept: ENT CLINIC | Age: 61
End: 2021-03-17

## 2021-03-17 NOTE — TELEPHONE ENCOUNTER
Igor Mcmanus called from [facility],  Patient needs a creatine order placed please    He is scheduled for a MRI with IAC  Tomorrow, please advise

## 2021-03-17 NOTE — TELEPHONE ENCOUNTER
creatine and bun need ordered for patient before they can get their MRI     MRI is at 6pm 03/18/2018    Fax orders to [phone]

## 2021-03-18 ENCOUNTER — HOSPITAL ENCOUNTER (OUTPATIENT)
Age: 61
Discharge: HOME | End: 2021-03-18
Payer: OTHER GOVERNMENT

## 2021-03-18 ENCOUNTER — HOSPITAL ENCOUNTER (OUTPATIENT)
Dept: MRI IMAGING | Age: 61
Discharge: HOME | End: 2021-03-18
Payer: OTHER GOVERNMENT

## 2021-03-18 LAB
BUN BLDV-MCNC: 10 MG/DL (ref 7–20)
CREAT SERPL-MCNC: 1 MG/DL (ref 0.8–1.3)
GFR AFRICAN AMERICAN: >60
GFR NON-AFRICAN AMERICAN: >60

## 2021-03-18 PROCEDURE — 36415 COLL VENOUS BLD VENIPUNCTURE: CPT

## 2021-03-18 PROCEDURE — 84520 ASSAY OF UREA NITROGEN: CPT

## 2021-03-18 PROCEDURE — 6360000004 HC RX CONTRAST MEDICATION: Performed by: OTOLARYNGOLOGY

## 2021-03-18 PROCEDURE — 70553 MRI BRAIN STEM W/O & W/DYE: CPT

## 2021-03-18 PROCEDURE — A9577 INJ MULTIHANCE: HCPCS | Performed by: OTOLARYNGOLOGY

## 2021-03-18 PROCEDURE — 82565 ASSAY OF CREATININE: CPT

## 2021-03-18 PROCEDURE — 2580000003 HC RX 258: Performed by: OTOLARYNGOLOGY

## 2021-03-18 RX ORDER — SODIUM CHLORIDE 0.9 % (FLUSH) 0.9 %
10 SYRINGE (ML) INJECTION ONCE
Status: COMPLETED | OUTPATIENT
Start: 2021-03-18 | End: 2021-03-18

## 2021-03-18 RX ADMIN — GADOBENATE DIMEGLUMINE 23 ML: 529 INJECTION, SOLUTION INTRAVENOUS at 19:21

## 2021-03-18 RX ADMIN — Medication 10 ML: at 19:21

## 2021-03-23 ENCOUNTER — OFFICE VISIT (OUTPATIENT)
Dept: NEUROLOGY | Age: 61
End: 2021-03-23
Payer: OTHER GOVERNMENT

## 2021-03-23 VITALS
BODY MASS INDEX: 37.89 KG/M2 | HEART RATE: 65 BPM | HEIGHT: 68 IN | DIASTOLIC BLOOD PRESSURE: 87 MMHG | TEMPERATURE: 98.4 F | SYSTOLIC BLOOD PRESSURE: 137 MMHG | OXYGEN SATURATION: 97 % | WEIGHT: 250 LBS

## 2021-03-23 DIAGNOSIS — G43.019 INTRACTABLE MIGRAINE WITHOUT AURA AND WITHOUT STATUS MIGRAINOSUS: Primary | ICD-10-CM

## 2021-03-23 DIAGNOSIS — G44.40 REBOUND HEADACHE: ICD-10-CM

## 2021-03-23 DIAGNOSIS — R42 VERTIGO: ICD-10-CM

## 2021-03-23 PROCEDURE — 99244 OFF/OP CNSLTJ NEW/EST MOD 40: CPT | Performed by: PSYCHIATRY & NEUROLOGY

## 2021-03-23 RX ORDER — TOPIRAMATE 25 MG/1
TABLET ORAL
Qty: 120 TABLET | Refills: 1 | Status: SHIPPED
Start: 2021-03-23 | End: 2021-05-26 | Stop reason: SINTOL

## 2021-03-23 NOTE — PROGRESS NOTES
Frequency   []  Hematuria     []  Urinary incontinence           [] Denies all of the above     Hematologic/lymphatic:  []  Bleeding    []  Easy bruising   []  Anemia  [] Denies all of the above     Musculoskeletal:   [] Back pain       []  Myalgias    []  Neck pain           [] Denies all of the above    Neurological: As noted in HPI    Behavioral/Psych:   [] Anxiety    []  Depression     []  Mood swings     [] Denies all of the above     Endocrine:   []  Temperature intolerance     [] Fatigue      [] Denies all of the above     Allergic/Immunologic:   [] Hay fever    [] Denies all of the above     Past Medical History:   Diagnosis Date    Esophageal reflux     Migraine, unspecified, without mention of intractable migraine without mention of status migrainosus     Other and unspecified hyperlipidemia     Unspecified hypothyroidism      Family History   Problem Relation Age of Onset    Coronary Art Dis Father 48    Parkinsonism Father 61    Coronary Art Dis Mother     No Known Problems Paternal Grandfather     Stroke Maternal Grandfather     Colon Cancer Maternal Grandfather     Lupus Sister     Dementia Maternal Grandmother     Early Death Paternal Grandmother         trauma    Thyroid Disease Paternal Grandmother      Social History     Socioeconomic History    Marital status:      Spouse name: Not on file    Number of children: Not on file    Years of education: Not on file    Highest education level: Not on file   Occupational History    Not on file   Social Needs    Financial resource strain: Not on file    Food insecurity     Worry: Not on file     Inability: Not on file    Transportation needs     Medical: Not on file     Non-medical: Not on file   Tobacco Use    Smoking status: Former Smoker     Years: 30.00     Types: Cigars     Quit date: 2019     Years since quittin.7    Smokeless tobacco: Never Used    Tobacco comment: 1-2 cigars (large)  a day x 10 years- off and on   Substance and Sexual Activity    Alcohol use: No     Alcohol/week: 0.0 standard drinks    Drug use: No    Sexual activity: Not on file   Lifestyle    Physical activity     Days per week: Not on file     Minutes per session: Not on file    Stress: Not on file   Relationships    Social connections     Talks on phone: Not on file     Gets together: Not on file     Attends Confucianism service: Not on file     Active member of club or organization: Not on file     Attends meetings of clubs or organizations: Not on file     Relationship status: Not on file    Intimate partner violence     Fear of current or ex partner: Not on file     Emotionally abused: Not on file     Physically abused: Not on file     Forced sexual activity: Not on file   Other Topics Concern    Not on file   Social History Narrative    Not on file       PHYSICAL EXAMINATION:  /87   Pulse 65   Temp 98.4 °F (36.9 °C)   Ht 5' 8\" (1.727 m)   Wt 250 lb (113.4 kg)   SpO2 97%   BMI 38.01 kg/m²   Appearance: Well appearing, well nourished and in no distress  Mental Status Exam: Patient is alert, oriented to person, place and time. Recent and remote memory is normal  Fund of Knowledge is normal  Attention/concentration is normal.   Speech : No dysarthria  Language : No aphasia  Funduscopic Exam: sharp disc margins  Cranial Nerves:   II: Visual fields:  Full to confrontation  III: Pupils:  equal, round, reactive to light  III,IV,VI: Extra Ocular Movements are intact. No nystagmus  V: Facial sensation is intact to pin prick and light touch  VII: Facial strength and movements: intact and symmetric smile,cheek puffing and eyebrow elevation  VIII: Hearing:  Intact to finger rub bilaterally  IX: Palate  elevation is symmetric  XI: Shoulder shrug is intact  XII: Tongue movements are normal  Motor:  Muscle tone and bulk are normal.   Strength is symmetrical 5/5 in all four extremities.   Sensory: Intact to light touch and  pin prick in all four extremities  Coordination:  Normal  Finger to Nose and Heel to Shin bilaterally    . Reflexes:  DTR +2 and symmetric bilaterally  Plantar response: Flexor bilaterally  Gait: Gait and station is normal. Patient can toe/ heel and tandem walk without difficulty  Romberg: negative  Vascular: No carotid bruit bilaterally        DATA:  LABS:  General Labs:    CBC:   Lab Results   Component Value Date    WBC 8.4 12/16/2020    RBC 4.77 12/16/2020    HGB 15.3 12/16/2020    HCT 44.8 12/16/2020    MCV 93.9 12/16/2020    MCH 32.1 12/16/2020    MCHC 34.2 12/16/2020    RDW 12.1 12/16/2020     12/16/2020    MPV 13.4 12/16/2020     BMP:    Lab Results   Component Value Date     01/28/2021    K 4.4 01/28/2021     01/28/2021    CO2 28 01/28/2021    BUN 10 03/18/2021    LABALBU 4.1 01/28/2021    CREATININE 1.0 03/18/2021    CALCIUM 9.4 01/28/2021    GFRAA >60 03/18/2021    LABGLOM >60 03/18/2021    GLUCOSE 84 01/28/2021    GLUCOSE 84 09/28/2018     RADIOLOGY REVIEW:  I have reviewed radiology image(s) and reports(s) of: MRI brain    IMPRESSION :  Intractable migraine headaches  Severe vertigo which get worse with head movement  Patient states that he has been seen by ENT and inner ear disease was considered but ruled out by negative testing. Nonfocal neurological examination  MRI brain images were independently reviewed and were normal  Rebound headache from frequent use of Excedrin migraine      RECOMMENDATIONS :  Discussed with patient and his wife at length  Recommended diet modifications  Recommended that he avoid chocolate and nitrate-containing foods  I will start him on topiramate and try to increase dose fairly quickly given the severity of his symptoms. I have also recommended that it drastically decrease the amount of Excedrin Migraine to prevent rebound headaches. I will see him back in 6 weeks for follow-up. Thank you for this consultation.         Please note a portion of this chart was generated using dragon dictation software. Although every effort was made to ensure the accuracy of this automated transcription, some errors in transcription may have occurred.

## 2021-03-26 RX ORDER — ONDANSETRON 4 MG/1
4 TABLET, FILM COATED ORAL 3 TIMES DAILY PRN
Qty: 20 TABLET | Refills: 0 | Status: SHIPPED | OUTPATIENT
Start: 2021-03-26 | End: 2021-04-06 | Stop reason: SDUPTHER

## 2021-03-26 NOTE — TELEPHONE ENCOUNTER
ondansetron (ZOFRAN) 4 MG tablet 20 tablet 0 3/15/2021     Sig - Route:  Take 1 tablet by mouth 3 times daily as needed for Nausea or Vomiting - Oral              [facility] in chart     Provider out of the office

## 2021-03-26 NOTE — TELEPHONE ENCOUNTER
Medication:   Requested Prescriptions     Pending Prescriptions Disp Refills    ondansetron (ZOFRAN) 4 MG tablet 20 tablet 0     Sig: Take 1 tablet by mouth 3 times daily as needed for Nausea or Vomiting      Provider out of office    Patient Phone Number: [phone] (home)     Last appt: 1/25/2021   Next appt: Visit date not found    Last OARRS: No flowsheet data found.   PDMP Monitoring:    Last PDMP Devendra Noriega as Reviewed [facility]):  Review User Review Instant Review Result          Preferred Pharmacy:   [facility] [address] [facility] [phone] - F [phone]  [address] [address]  Phone: [phone] Fax: [phone]    [address] [phone] Prabhu Gallagher [phone]  [address]  Phone: [phone] Fax: [phone]

## 2021-04-06 RX ORDER — ONDANSETRON 4 MG/1
4 TABLET, FILM COATED ORAL 3 TIMES DAILY PRN
Qty: 20 TABLET | Refills: 0 | Status: SHIPPED | OUTPATIENT
Start: 2021-04-06 | End: 2021-04-15 | Stop reason: SDUPTHER

## 2021-04-06 NOTE — TELEPHONE ENCOUNTER
Medication:   Requested Prescriptions     Pending Prescriptions Disp Refills    ondansetron (ZOFRAN) 4 MG tablet 20 tablet 0     Sig: Take 1 tablet by mouth 3 times daily as needed for Nausea or Vomiting      Last Filled:  3/26/21    Patient Phone Number: [phone] (home)     Last appt: 1/25/2021   Next appt: Visit date not found    Last OARRS: No flowsheet data found.   PDMP Monitoring:    Last PDMP Gordon Starks as Reviewed [facility]):  Review User Review Instant Review Result          Preferred Pharmacy:   [facility] [address] [facility] [phone] - F [phone]  [address] [place] [address]  Phone: [phone] Fax: [phone]    [address] [phone] [facility] [phone]  [address]  Phone: [phone] Fax: [phone]

## 2021-04-06 NOTE — TELEPHONE ENCOUNTER
PT is requesting refills on ondansetron (ZOFRAN) 4 MG tablet to be called into [facility], [address]

## 2021-04-15 RX ORDER — ONDANSETRON 4 MG/1
4 TABLET, FILM COATED ORAL 3 TIMES DAILY PRN
Qty: 90 TABLET | Refills: 1 | Status: SHIPPED | OUTPATIENT
Start: 2021-04-15 | End: 2021-05-26 | Stop reason: SDUPTHER

## 2021-05-26 ENCOUNTER — VIRTUAL VISIT (OUTPATIENT)
Dept: FAMILY MEDICINE CLINIC | Age: 61
End: 2021-05-26
Payer: OTHER GOVERNMENT

## 2021-05-26 ENCOUNTER — TELEPHONE (OUTPATIENT)
Dept: FAMILY MEDICINE CLINIC | Age: 61
End: 2021-05-26

## 2021-05-26 DIAGNOSIS — H81.09 MENIERE'S DISEASE, UNSPECIFIED LATERALITY: Primary | ICD-10-CM

## 2021-05-26 DIAGNOSIS — E78.5 HYPERLIPIDEMIA, UNSPECIFIED HYPERLIPIDEMIA TYPE: ICD-10-CM

## 2021-05-26 DIAGNOSIS — Z12.5 SCREENING PSA (PROSTATE SPECIFIC ANTIGEN): ICD-10-CM

## 2021-05-26 PROCEDURE — 99214 OFFICE O/P EST MOD 30 MIN: CPT | Performed by: FAMILY MEDICINE

## 2021-05-26 RX ORDER — TRIAMTERENE AND HYDROCHLOROTHIAZIDE 37.5; 25 MG/1; MG/1
1 TABLET ORAL DAILY
Qty: 90 TABLET | Refills: 3 | Status: SHIPPED | OUTPATIENT
Start: 2021-05-26 | End: 2021-08-10

## 2021-05-26 RX ORDER — ONDANSETRON 4 MG/1
4 TABLET, FILM COATED ORAL 3 TIMES DAILY PRN
Qty: 180 TABLET | Refills: 1 | Status: SHIPPED | OUTPATIENT
Start: 2021-05-26 | End: 2021-10-08 | Stop reason: SDUPTHER

## 2021-05-26 RX ORDER — INFLUENZA A VIRUS A/SINGAPORE/GP1908/2015 IVR-180 (H1N1) ANTIGEN (MDCK CELL DERIVED, PROPIOLACTONE INACTIVATED), INFLUENZA A VIRUS A/NORTH CAROLINA/04/2016 (H3N2) HEMAGGLUTININ ANTIGEN (MDCK CELL DERIVED, PROPIOLACTONE INACTIVATED), INFLUENZA B VIRUS B/IOWA/06/2017 HEMAGGLUTININ ANTIGEN (MDCK CELL DERIVED, PROPIOLACTONE INACTIVATED), INFLUENZA B VIRUS B/SINGAPORE/INFTT-16-0610/2016 HEMAGGLUTININ ANTIGEN (MDCK CELL DERIVED, PROPIOLACTONE INACTIVATED) 15; 15; 15; 15 UG/.5ML; UG/.5ML; UG/.5ML; UG/.5ML
INJECTION, SUSPENSION INTRAMUSCULAR
COMMUNITY
Start: 2020-10-17 | End: 2021-05-26

## 2021-05-26 RX ORDER — CETIRIZINE HYDROCHLORIDE 10 MG/1
10 TABLET ORAL
COMMUNITY
Start: 2021-01-25 | End: 2021-05-26

## 2021-05-26 RX ORDER — MECLIZINE HCL 12.5 MG/1
12.5 TABLET ORAL 3 TIMES DAILY PRN
Qty: 90 TABLET | Refills: 3 | Status: SHIPPED | OUTPATIENT
Start: 2021-05-26 | End: 2021-06-28 | Stop reason: SDUPTHER

## 2021-05-26 NOTE — PROGRESS NOTES
Panel; Future -  No MDM    Sx do sound suspicious for menieres  Trial maxzide  Continue with chiropractor as sounds like doing vertigo maneuvers  Continue zofran and meclizine prn  Recheck 1 month  Discussed going to ENT specialist for menieres at [facility] if this doesn't help  Get labs done before next visit. Other orders  -     triamterene-hydroCHLOROthiazide (MAXZIDE-25) 37.5-25 MG per tablet; Take 1 tablet by mouth daily  -     ondansetron (ZOFRAN) 4 MG tablet; Take 1 tablet by mouth 3 times daily as needed for Nausea or Vomiting  -     meclizine (ANTIVERT) 12.5 MG tablet; Take 1 tablet by mouth 3 times daily as needed for Dizziness            Return in about 1 month (around 6/26/2021) for 30 min, Follow up. Pursuant to the emergency declaration under the [address] [place] [address] waiver authority and the [employer] Act, this Virtual  Visit was conducted, with patient's consent, to reduce the patient's risk of exposure to COVID-19 and provide continuity of care for an established patient. Services were provided through a video synchronous discussion virtually to substitute for in-person clinic visit. Patient was instructed that the AVS is available on My Chart or was emailed to the patient if not on My Chart. Lab orders were emailed to patient if they do not use a [facility] lab. Any work notes were sent to patient through My Chart or email.

## 2021-06-28 ENCOUNTER — TELEPHONE (OUTPATIENT)
Dept: FAMILY MEDICINE CLINIC | Age: 61
End: 2021-06-28

## 2021-06-28 RX ORDER — MECLIZINE HCL 12.5 MG/1
12.5 TABLET ORAL 3 TIMES DAILY PRN
Qty: 90 TABLET | Refills: 3 | Status: SHIPPED | OUTPATIENT
Start: 2021-06-28 | End: 2021-08-10 | Stop reason: SDUPTHER

## 2021-06-28 NOTE — TELEPHONE ENCOUNTER
Patient and wife informed. They state once blood work comes in, they will set up appointment to discuss that and depression medication.

## 2021-06-28 NOTE — TELEPHONE ENCOUNTER
Sent meclizine    Not sure what he is talking about anxiety med    Due for RTO anyway (was supposed to f/u in a motnh after last visit) so make appt to discuss

## 2021-06-28 NOTE — TELEPHONE ENCOUNTER
meclizine (ANTIVERT) 12.5 MG tablet [[id number]]         [facility]  [phone]      Patient would also like a prescription for an anti anxiety medication.   States he spoke with you about this before

## 2021-06-28 NOTE — TELEPHONE ENCOUNTER
Medication:   Requested Prescriptions     Pending Prescriptions Disp Refills    meclizine (ANTIVERT) 12.5 MG tablet 90 tablet 3     Sig: Take 1 tablet by mouth 3 times daily as needed for Dizziness      Different pharmacy than where last Rx was sent    Patient Phone Number: [phone] (home)     Last appt: 5/26/2021   Next appt: Visit date not found    Last OARRS: No flowsheet data found.   PDMP Monitoring:    Last PDMP John Long as Reviewed [facility]):  Review User Review Instant Review Result          Preferred Pharmacy:   [facility] [address] [facility] [phone] - F [phone]  [address] [place] [address]  Phone: [phone] Fax: [phone]    [address] [phone] [facility] [phone]  [id number] [place] [address]  Phone: [phone] Fax: [phone]

## 2021-07-01 RX ORDER — MECLIZINE HYDROCHLORIDE 25 MG/1
25 TABLET ORAL 3 TIMES DAILY PRN
Qty: 90 TABLET | Refills: 3 | Status: SHIPPED | OUTPATIENT
Start: 2021-07-01 | End: 2021-07-11

## 2021-07-01 NOTE — TELEPHONE ENCOUNTER
----- Message from [place] sent at 6/30/2021 12:56 PM EDT -----  Subject: Medication Problem    QUESTIONS  Name of Medication? meclizine (ANTIVERT) 25 MG tablet  Patient-reported dosage and instructions? Patient states he takes 25 mg, 1   pill 3x daily as needed - however 12.5 mg was ordered  What question or problem do you have with the medication? Meclizine should   be reordered at the prior 25 mg tablet strength please  Preferred Pharmacy? [facility] [place] [phone]  Pharmacy phone number (if available)? [phone]  Additional Information for Provider? Patient is requesting corrected dose   to his Meclizine please   ---------------------------------------------------------------------------  --------------  CALL BACK INFO  What is the best way for the office to contact you? OK to respond with   electronic message via [employer] portal (only for patients who have   registered [employer] account)  Preferred Call Back Phone Number? [phone]  ---------------------------------------------------------------------------  --------------  SCRIPT ANSWERS  Relationship to Patient?  Self

## 2021-07-01 NOTE — TELEPHONE ENCOUNTER
Medication:   Requested Prescriptions     Pending Prescriptions Disp Refills    meclizine (ANTIVERT) 25 MG tablet 90 tablet 3     Sig: Take 1 tablet by mouth 3 times daily as needed for Dizziness or Nausea        Patient Phone Number: [phone] (home)     Last appt: 5/26/2021   Next appt: 7/22/2021    Last OARRS: No flowsheet data found.   PDMP Monitoring:    Last PDMP Inga Damon as Reviewed [facility]):  Review User Review Instant Review Result          Preferred Pharmacy:   [facility] [address] [facility] [phone] - F [phone]  [address] [address]  Phone: [phone] Fax: [phone]    [address] [phone] [place] Smith [phone]  [address]  Phone: [phone] Fax: [phone]

## 2021-07-21 ENCOUNTER — TELEPHONE (OUTPATIENT)
Dept: FAMILY MEDICINE CLINIC | Age: 61
End: 2021-07-21

## 2021-07-21 RX ORDER — HYDROXYZINE PAMOATE 50 MG/1
50 CAPSULE ORAL 3 TIMES DAILY PRN
Qty: 90 CAPSULE | Refills: 0 | Status: SHIPPED | OUTPATIENT
Start: 2021-07-21 | End: 2021-08-10 | Stop reason: SDUPTHER

## 2021-07-21 NOTE — TELEPHONE ENCOUNTER
Have sent over prescription for vistaril, works for dizziness and anxiety, does not show in chart that he has been prescribed. Advised follow up w/ PCP or go to ED if becomes worse.

## 2021-07-21 NOTE — TELEPHONE ENCOUNTER
Patient would like to see if he could receive a medication to help with his anxiety of meniere's. Patient was suppose to come to office on 7/22 but had to r/s due to Dr Petey Jang being out of office, he cannot lainey until his appt on 8/10.     Please advise

## 2021-08-10 ENCOUNTER — OFFICE VISIT (OUTPATIENT)
Dept: FAMILY MEDICINE CLINIC | Age: 61
End: 2021-08-10
Payer: OTHER GOVERNMENT

## 2021-08-10 VITALS
TEMPERATURE: 97.7 F | BODY MASS INDEX: 36.34 KG/M2 | WEIGHT: 239 LBS | DIASTOLIC BLOOD PRESSURE: 80 MMHG | SYSTOLIC BLOOD PRESSURE: 128 MMHG | HEART RATE: 58 BPM | OXYGEN SATURATION: 98 %

## 2021-08-10 DIAGNOSIS — F41.9 ANXIETY: ICD-10-CM

## 2021-08-10 DIAGNOSIS — N28.9 RENAL INSUFFICIENCY: ICD-10-CM

## 2021-08-10 DIAGNOSIS — H81.09 MENIERE'S DISEASE, UNSPECIFIED LATERALITY: Primary | ICD-10-CM

## 2021-08-10 DIAGNOSIS — N40.1 BENIGN PROSTATIC HYPERPLASIA WITH LOWER URINARY TRACT SYMPTOMS, SYMPTOM DETAILS UNSPECIFIED: ICD-10-CM

## 2021-08-10 PROCEDURE — 99214 OFFICE O/P EST MOD 30 MIN: CPT | Performed by: FAMILY MEDICINE

## 2021-08-10 RX ORDER — MECLIZINE HYDROCHLORIDE 25 MG/1
25 TABLET ORAL 3 TIMES DAILY PRN
Qty: 270 TABLET | Refills: 3 | Status: SHIPPED | OUTPATIENT
Start: 2021-08-10 | End: 2022-08-23 | Stop reason: SDUPTHER

## 2021-08-10 RX ORDER — HYDROXYZINE PAMOATE 50 MG/1
50 CAPSULE ORAL 3 TIMES DAILY PRN
Qty: 270 CAPSULE | Refills: 3 | Status: SHIPPED | OUTPATIENT
Start: 2021-08-10 | End: 2021-09-09

## 2021-08-10 ASSESSMENT — PATIENT HEALTH QUESTIONNAIRE - PHQ9
SUM OF ALL RESPONSES TO PHQ QUESTIONS 1-9: 0
SUM OF ALL RESPONSES TO PHQ QUESTIONS 1-9: 0
1. LITTLE INTEREST OR PLEASURE IN DOING THINGS: 0
SUM OF ALL RESPONSES TO PHQ9 QUESTIONS 1 & 2: 0
2. FEELING DOWN, DEPRESSED OR HOPELESS: 0
SUM OF ALL RESPONSES TO PHQ QUESTIONS 1-9: 0

## 2021-08-10 NOTE — PROGRESS NOTES
Patient is here to follow up on his meniere's disease. States that he is still having symptoms off and on. It has actually improved since starting the hydroxyzine medication. Noah Bhandari it is \"night and day\" better. Taking TID. States still having episodes but can \"feel it coming on\" - increase in tinnitus and eyes start doing weird stuff. Will take a meclizine then and it will help. Having to use meclizine twice a day. States fluorescent lights set it off so wears hat and sunglasses and helps. Has only had to use zofran once in past few months. Taking triamteren HCTZ, states feels like it improved the tinnitus some but didn't help the dizziness or other sx. Meclizine def helps when gets dizzy    Hydroxyzine - taking one every 5-6 hours and states without it \"can't function. \" States feels \"normal\" when takes it. Feels like taking less meclizine since on the hydroxyzine. States no migraines. Still getting adjustments from chiropractor regularly. Vitals:    08/10/21 0907   BP: 128/80   Site: Left Upper Arm   Position: Sitting   Cuff Size: Large Adult   Pulse: 58   Temp: 97.7 °F (36.5 °C)   TempSrc: Infrared   SpO2: 98%   Weight: 239 lb (108.4 kg)     Wt Readings from Last 3 Encounters:   08/10/21 239 lb (108.4 kg)   03/23/21 250 lb (113.4 kg)   03/04/21 252 lb (114.3 kg)     Body mass index is 36.34 kg/m². PHQ Scores 8/10/2021 1/25/2021 10/31/2019 10/18/2018 10/3/2017   PHQ2 Score 0 2 0 0 0   PHQ9 Score 0 2 0 0 0           GEN: Alert and oriented x 4 NAD, affect appropriate and obese, well hydrated, well developed. ASSESSMENT AND PLAN:       Geoff Doe was seen today for dizziness.     Diagnoses and all orders for this visit:    Meniere's disease, unspecified laterality  Sounds like better with addition of hydroxyzine  Doesn't sound like maxzide helped at all   Stop that, continue meclizine and hydroxyzine and monitor    Anxiety  As above    Benign prostatic hyperplasia with lower urinary tract symptoms, symptom details unspecified  psa high but stable, monitor    Renal insufficiency  -     Basic Metabolic Panel; Future  rechekc labs  (Labs ordered contributed to MDM)    Other orders  -     hydrOXYzine (VISTARIL) 50 MG capsule; Take 1 capsule by mouth 3 times daily as needed for Anxiety  -     meclizine (ANTIVERT) 25 MG tablet; Take 1 tablet by mouth 3 times daily as needed for Dizziness            Return in about 6 months (around 2/10/2022) for Follow up, 30 min.          Portions of Note per  Ila Gustafson CMA AAMA with corrections and edits per Wes Guevara MD.  I agree with entirety of note and was present and performed history and physical.  I also confirm that the note above accurately reflects all work, treatment, procedures, and medical decision making performed by me, Wes Guevara MD

## 2021-09-08 ENCOUNTER — TELEPHONE (OUTPATIENT)
Dept: FAMILY MEDICINE CLINIC | Age: 61
End: 2021-09-08

## 2021-09-08 DIAGNOSIS — H81.09 MENIERE'S DISEASE, UNSPECIFIED LATERALITY: Primary | ICD-10-CM

## 2021-09-08 NOTE — TELEPHONE ENCOUNTER
Agree with restarting the maxzide    We have discussed before him going to [facility] to see one of the ENT there to discuss more, would advise him that is next step

## 2021-09-08 NOTE — TELEPHONE ENCOUNTER
hydrOXYzine (VISTARIL) 50 MG capsule [[id number]    ondansetron (ZOFRAN) 4 MG tablet [[id number]    Patient called with concerns that the above medications seem to not be working as well as before and would like to know if you can prescribe something different.       He has started taking the water pill Maxzide as he is having issues

## 2021-09-09 RX ORDER — ESCITALOPRAM OXALATE 10 MG/1
10 TABLET ORAL DAILY
Qty: 30 TABLET | Refills: 3 | Status: SHIPPED | OUTPATIENT
Start: 2021-09-09 | End: 2022-01-06

## 2021-09-09 NOTE — TELEPHONE ENCOUNTER
He needs appt to discuss if getting worse    Would recommend starting a medication like lexapro 10mg daily to help with anxiety but needs RTO to discuss more

## 2021-09-09 NOTE — TELEPHONE ENCOUNTER
Wife informed. She is ok to let patient try Lexapro and have patient set up upcoming appointment in a couple of weeks.       Referral for ENT per wife request placed and faxed to specialist.

## 2021-10-08 RX ORDER — ONDANSETRON 4 MG/1
4 TABLET, FILM COATED ORAL 3 TIMES DAILY PRN
Qty: 180 TABLET | Refills: 1 | Status: SHIPPED | OUTPATIENT
Start: 2021-10-08 | End: 2022-02-07 | Stop reason: SDUPTHER

## 2021-10-08 NOTE — TELEPHONE ENCOUNTER
Medication:   Requested Prescriptions     Pending Prescriptions Disp Refills    ondansetron (ZOFRAN) 4 MG tablet 180 tablet 1     Sig: Take 1 tablet by mouth 3 times daily as needed for Nausea or Vomiting    Patient Phone Number: [phone] (home)     Last appt: 8/10/2021   Next appt: Visit date not found    Last OARRS: No flowsheet data found.   PDMP Monitoring:    Last PDMP Ashley Forrest as Reviewed [facility]):  Review User Review Instant Review Result          Preferred Pharmacy:   [address] [place] [address] [address] [facility] [phone] - F [phone]  [address] [address]  Phone: [phone] Fax: [phone]    [address] [phone] [facility] [phone]  [address]  Phone: [phone] Fax: [phone]

## 2022-01-05 NOTE — TELEPHONE ENCOUNTER
Medication:   Requested Prescriptions     Pending Prescriptions Disp Refills    escitalopram (LEXAPRO) 10 MG tablet [Pharmacy Med Name: ESCITALOPRAM 10 MG TABLET] 30 tablet 3     Sig: TAKE ONE TABLET BY MOUTH DAILY     Last Filled: 9/9/21    Last appt: 8/10/2021   Next appt: Visit date not found    Last Lipid:   Lab Results   Component Value Date    CHOL 219 06/28/2021    TRIG 135 06/28/2021    HDL 49 06/28/2021    LDLCALC 144 06/28/2021

## 2022-01-06 RX ORDER — ESCITALOPRAM OXALATE 10 MG/1
TABLET ORAL
Qty: 30 TABLET | Refills: 3 | Status: SHIPPED | OUTPATIENT
Start: 2022-01-06 | End: 2022-05-09

## 2022-05-09 RX ORDER — ESCITALOPRAM OXALATE 10 MG/1
TABLET ORAL
Qty: 30 TABLET | Refills: 3 | Status: SHIPPED | OUTPATIENT
Start: 2022-05-09 | End: 2022-08-23

## 2022-05-09 NOTE — TELEPHONE ENCOUNTER
Medication:   Requested Prescriptions     Pending Prescriptions Disp Refills    escitalopram (LEXAPRO) 10 MG tablet [Pharmacy Med Name: ESCITALOPRAM 10 MG TABLET] 30 tablet 3     Sig: TAKE ONE TABLET BY MOUTH DAILY          Patient Phone Number: [phone] (home)     Last appt: 8/10/2021   Next appt: Visit date not found    Last OARRS: No flowsheet data found.   PDMP Monitoring:    Last PDMP Inga Damon as Reviewed [facility]):  Review User Review Instant Review Result          Preferred Pharmacy:   [facility] [id number] - [address] [place] [address], [address] [place] [address]  Phone: [phone] Fax: [phone]    [address] [phone] [facility] [phone]  [address]  Phone: [phone] Fax: [phone]

## 2022-08-22 NOTE — PROGRESS NOTES
Here for annual physical and f/u thyroid, vertigo. Dental: up-to-date  Eye: up-to-date    Colonoscopy: up-to-date    Exercise: working on [employer] basement but o/w nothing. Diet: 2 meals daily with healthy diet     Living Will: No,   Additional information provided    [facility] Scores 8/23/2022 8/10/2021 1/25/2021 10/31/2019 10/18/2018 10/3/2017   PHQ2 Score 0 0 2 0 0 0   PHQ9 Score 0 0 2 0 0 0     Thyroid - taking reg, no problems, no missed doses, takes on empty stomach, same time every day    Vertigo/vestibular migraine - seeing specialist at [facility]. Started on nortriptyline and sx improve. Using zofran if having to drive and also if in big group settings. Usually about once a day. Uses meclizine prn, maybe 2-3 times every other day. If works from home doesn't need to take anything. States now can tell when an episode coming on and can treat before gets bad. HM reviewed with pt    Patient's medications, allergies, past medical, surgical, social and family histories were reviewed and updated in the EHR as appropriate. Vitals:    08/23/22 1002   BP: 130/88   Site: Left Upper Arm   Position: Sitting   Cuff Size: Large Adult   Pulse: 74   Temp: 98.8 °F (37.1 °C)   TempSrc: Infrared   SpO2: 98%   Weight: 264 lb (119.7 kg)   Height: 5' 9\" (1.753 m)     Wt Readings from Last 3 Encounters:   08/23/22 264 lb (119.7 kg)   08/10/21 239 lb (108.4 kg)   03/23/21 250 lb (113.4 kg)     Body mass index is 38.99 kg/m². GENERAL Alert and oriented x 4 NAD, affect appropriate and obese, well hydrated, well developed.   NECK:supple and non tender without mass, no thyromegaly or thyroid nodules, no cervical lymphadenopathy  HEENT: TM clear bilaterally  LUNG:clear to auscultation bilaterally with normal respiratory effort  CV: Normal heart sounds, regular rate and rhythm without murmurs  EXTREMETY: no loss of hair, no edema, normal pedal pulses bilaterally  NEURO: CN grossly intact, moving all extremities equally, no gross deficits          ASSESSMENT AND PLAN:       Brady Ricardo was seen today for annual exam.    Diagnoses and all orders for this visit:    Well adult exam    Recommended screenings discussed and ordered if patient agreed  Recommended vaccinations discussed and ordered if patient agreed  Encouraged healthy diet   Encouraged regular exercise and maintaining a healthy weight  Discussed living will/healthcare POA and encouraged to get if doesn't have. Hypothyroidism, unspecified type  -     TSH with Reflex; Future  -Stable, continue current medications. (Labs ordered contributed to MDM)    Hyperlipidemia, unspecified hyperlipidemia type  -     Lipid Panel; Future  -     Comprehensive Metabolic Panel; Future  Recheck labs  (Labs ordered contributed to MDM)    Elevated PSA  -     PSA, Prostatic Specific Antigen; Future  (Labs ordered contributed to MDM)    Vertigo  Follows with UC    Other orders  -     SYNTHROID 112 MCG tablet; Take 1 tablet by mouth Daily  -     fluticasone (FLONASE) 50 MCG/ACT nasal spray; 2 sprays by Nasal route daily Both Nostrils  -     Cetirizine HCl (ZYRTEC ALLERGY) 10 MG CAPS; Take 10 mg by mouth daily as needed (allergies)  -     Discontinue: ondansetron (ZOFRAN) 4 MG tablet; Take 1 tablet by mouth 3 times daily as needed for Nausea or Vomiting  -     meclizine (ANTIVERT) 25 MG tablet; Take 1 tablet by mouth 3 times daily as needed for Dizziness  -     nortriptyline (PAMELOR) 10 MG capsule; Take 6 capsules by mouth nightly  -     ondansetron (ZOFRAN) 4 MG tablet; Take 1 tablet by mouth 3 times daily as needed for Nausea or Vomiting          Return in about 1 year (around 8/23/2023) for Well, 30 min.          Portions of Note per  Sinan Gordon CMA AAMA with corrections and edits per Jaya King MD.  I agree with entirety of note and was present and performed history and physical.  I also confirm that the note above accurately reflects all work, treatment, procedures, and medical decision making performed by me, Sneha Dinero MD

## 2022-08-23 ENCOUNTER — OFFICE VISIT (OUTPATIENT)
Dept: FAMILY MEDICINE CLINIC | Age: 62
End: 2022-08-23
Payer: OTHER GOVERNMENT

## 2022-08-23 VITALS
SYSTOLIC BLOOD PRESSURE: 130 MMHG | BODY MASS INDEX: 39.1 KG/M2 | WEIGHT: 264 LBS | OXYGEN SATURATION: 98 % | HEIGHT: 69 IN | TEMPERATURE: 98.8 F | DIASTOLIC BLOOD PRESSURE: 88 MMHG | HEART RATE: 74 BPM

## 2022-08-23 DIAGNOSIS — R97.20 ELEVATED PSA: ICD-10-CM

## 2022-08-23 DIAGNOSIS — Z00.00 WELL ADULT EXAM: Primary | ICD-10-CM

## 2022-08-23 DIAGNOSIS — E03.9 HYPOTHYROIDISM, UNSPECIFIED TYPE: ICD-10-CM

## 2022-08-23 DIAGNOSIS — R42 VERTIGO: ICD-10-CM

## 2022-08-23 DIAGNOSIS — E78.5 HYPERLIPIDEMIA, UNSPECIFIED HYPERLIPIDEMIA TYPE: ICD-10-CM

## 2022-08-23 PROBLEM — H81.09 MENIERE'S DISEASE: Status: RESOLVED | Noted: 2021-05-26 | Resolved: 2022-08-23

## 2022-08-23 PROCEDURE — 99214 OFFICE O/P EST MOD 30 MIN: CPT | Performed by: FAMILY MEDICINE

## 2022-08-23 PROCEDURE — 99396 PREV VISIT EST AGE 40-64: CPT | Performed by: FAMILY MEDICINE

## 2022-08-23 RX ORDER — ONDANSETRON 4 MG/1
4 TABLET, FILM COATED ORAL 3 TIMES DAILY PRN
Qty: 30 TABLET | Refills: 1 | Status: SHIPPED | OUTPATIENT
Start: 2022-08-23

## 2022-08-23 RX ORDER — RIZATRIPTAN BENZOATE 10 MG/1
10 TABLET, ORALLY DISINTEGRATING ORAL DAILY PRN
COMMUNITY
Start: 2022-01-05

## 2022-08-23 RX ORDER — MONTELUKAST SODIUM 10 MG/1
TABLET ORAL
COMMUNITY
Start: 2022-05-23

## 2022-08-23 RX ORDER — LEVOTHYROXINE SODIUM 112 MCG
112 TABLET ORAL DAILY
Qty: 90 TABLET | Refills: 3 | Status: SHIPPED
Start: 2022-08-23 | End: 2022-08-29 | Stop reason: DRUGHIGH

## 2022-08-23 RX ORDER — ONDANSETRON 4 MG/1
4 TABLET, FILM COATED ORAL 3 TIMES DAILY PRN
Qty: 180 TABLET | Refills: 3 | Status: SHIPPED | OUTPATIENT
Start: 2022-08-23 | End: 2022-08-23 | Stop reason: SDUPTHER

## 2022-08-23 RX ORDER — FLUTICASONE PROPIONATE 50 MCG
SPRAY, SUSPENSION (ML) NASAL
COMMUNITY
End: 2022-08-23

## 2022-08-23 RX ORDER — NORTRIPTYLINE HYDROCHLORIDE 10 MG/1
CAPSULE ORAL
COMMUNITY
Start: 2022-08-15 | End: 2022-08-23

## 2022-08-23 RX ORDER — MECLIZINE HYDROCHLORIDE 25 MG/1
25 TABLET ORAL 3 TIMES DAILY PRN
Qty: 270 TABLET | Refills: 3 | Status: SHIPPED | OUTPATIENT
Start: 2022-08-23

## 2022-08-23 RX ORDER — HYDROXYZINE PAMOATE 50 MG/1
CAPSULE ORAL
COMMUNITY
Start: 2022-05-23 | End: 2022-11-02 | Stop reason: SDUPTHER

## 2022-08-23 RX ORDER — CETIRIZINE HYDROCHLORIDE 10 MG/1
10 CAPSULE, LIQUID FILLED ORAL DAILY PRN
Qty: 90 CAPSULE | Refills: 3 | Status: SHIPPED | OUTPATIENT
Start: 2022-08-23

## 2022-08-23 RX ORDER — NORTRIPTYLINE HYDROCHLORIDE 10 MG/1
60 CAPSULE ORAL NIGHTLY
Qty: 30 CAPSULE | Refills: 5
Start: 2022-08-23

## 2022-08-23 RX ORDER — FLUTICASONE PROPIONATE 50 MCG
2 SPRAY, SUSPENSION (ML) NASAL DAILY
Qty: 3 EACH | Refills: 3 | Status: SHIPPED | OUTPATIENT
Start: 2022-08-23

## 2022-08-23 ASSESSMENT — PATIENT HEALTH QUESTIONNAIRE - PHQ9
1. LITTLE INTEREST OR PLEASURE IN DOING THINGS: 0
SUM OF ALL RESPONSES TO PHQ QUESTIONS 1-9: 0
2. FEELING DOWN, DEPRESSED OR HOPELESS: 0
SUM OF ALL RESPONSES TO PHQ QUESTIONS 1-9: 0
SUM OF ALL RESPONSES TO PHQ QUESTIONS 1-9: 0
SUM OF ALL RESPONSES TO PHQ9 QUESTIONS 1 & 2: 0
SUM OF ALL RESPONSES TO PHQ QUESTIONS 1-9: 0

## 2022-08-23 NOTE — PATIENT INSTRUCTIONS
Schedule your COVID booster at your local pharmacy. After your initial 2 dose series you can receive any of the vaccines for your booster doses. You can get a dose 5 months after your last one. Check with pharmacy about getting the shingles vaccine, Shingrix (not Zostavax)     Please bring in a copy of your living will and healthcare power of  to put in your chart. Link to forms:   [email or web address]      Advance Directives and DNR    Decision making at the end of life is difficult for patients, families and health care providers. Since the early 1990s, a number of forms have been developed to help people express their wishes in advance. What are \"advance directives\"? Advance directives are documents that can help you remain in charge of your health care even after you can no longer make decisions for yourself. The two most common forms of written advance directives are the living will and durable power of  for healthcare. Some people seek an s services to complete these documents; however this is not required. You can complete these documents yourself and have them either notarized or witnessed by two people who are over 25 and not related to you by blood or marriage. What is a \"living will\"? A living will is a document that tells your doctor how you want to be treated if when you are determined to be terminally ill or permanently unconscious and you cannot make decisions for yourself. You can use a living will if you want to avoid life-prolonging treatments such as cardiopulmonary resuscitation (CPR), kidney dialysis or breathing machines. You can use your living will to tell your doctor that you just want to be pain free at the end of our life. In [place], the living will is sometimes called a \"declaration\".  A living will form can be obtained from attorneys, [facility] organizations, and healthcare facilities. This signed form must be notarized or witnessed. What is a \"durable power of  for healthcare\"? A medical power of  (medical POA) is another type of advance directive that allows you to name a person to make health care decisions for you if and when you become unable to make them for yourself. The person you name to make decisions on your behalf is some times called your health care surrogate, agent, proxy or -in-fact. The person who holds your medical POA can respond to medical situations you might not have anticipated and make decisions for you empowered by knowledge of your values and wishes. The medical POA form can be obtained from attorneys, [facility], and healthcare facilities. This signed form must be notarized or witnessed. The medical POA document is different from the power of  form that authorizes someone to make financial transactions for you. What is cardiopulmonary resuscitation (CPR)? CPR is a technique useful in many emergencies, including heart attack or near drowning, in which someone's breathing or heartbeat has stopped. CPR may include chest compression, mouth-to-mouth or other rescue breathing and/or electric shock. What is a DNR -Comfort Care form (a.k.a. [facility])? DNR means do not resuscitate. A DNR is a medical order given by a physician or other legally authorized prescriber. It addresses the various methods used to revive people whose hearts have stopped beating /or who have stopped breathing. If a person has a [facility] order, he will receive care that eases pain and suffering but no cardio-pulmonary resuscitation (CPR) to save or prolong life. The [facility] becomes active as soon as it is signed by the doctor or advanced practice nurse. The [facility] is a standard form which can be obtained from the [address] [facility] or healthcare facilities.     What is DNR Comfort Care - Arrest (a.k.a. [facility])? The [facility] is similar to the [facility] but it only becomes active if and when the person has a cardiac and/or respiratory arrest (i.e. the person stops breathing or his heart stops beating). The [facility] is a standard form which can be obtained from the [address] [facility] or healthcare facilities.

## 2022-08-27 LAB
A/G RATIO: 1.6 (CALC) (ref 1–2.5)
ALBUMIN SERPL-MCNC: 4.3 G/DL (ref 3.6–5.1)
ALP BLD-CCNC: 83 U/L (ref 35–144)
ALT SERPL-CCNC: 16 U/L (ref 9–46)
AST SERPL-CCNC: 19 U/L (ref 10–35)
BILIRUB SERPL-MCNC: 0.6 MG/DL (ref 0.2–1.2)
BUN / CREAT RATIO: 9 (CALC) (ref 6–22)
BUN BLDV-MCNC: 13 MG/DL (ref 7–25)
CALCIUM SERPL-MCNC: 9.7 MG/DL (ref 8.6–10.3)
CHLORIDE BLD-SCNC: 100 MMOL/L (ref 98–110)
CHOLESTEROL, TOTAL: 268 MG/DL
CHOLESTEROL/HDL RATIO: 5.8 (CALC)
CO2: 31 MMOL/L (ref 20–32)
CREAT SERPL-MCNC: 1.5 MG/DL (ref 0.7–1.35)
ESTIMATED GLOMERULAR FILTRATION RATE CREATININE EQUATION: 53 ML/MIN/1.73M2
GLOBULIN: 2.7 G/DL (CALC) (ref 1.9–3.7)
GLUCOSE BLD-MCNC: 87 MG/DL (ref 65–99)
HDLC SERPL-MCNC: 46 MG/DL
LDL CHOLESTEROL CALCULATED: 187 MG/DL (CALC)
NONHDLC SERPL-MCNC: 222 MG/DL (CALC)
POTASSIUM SERPL-SCNC: 4.7 MMOL/L (ref 3.5–5.3)
PROSTATE SPECIFIC ANTIGEN: 6.14 NG/ML
SODIUM BLD-SCNC: 136 MMOL/L (ref 135–146)
T4 FREE: 1.4 NG/DL (ref 0.8–1.8)
TOTAL PROTEIN: 7 G/DL (ref 6.1–8.1)
TRIGL SERPL-MCNC: 177 MG/DL
TSH ULTRASENSITIVE: 4.71 MIU/L (ref 0.4–4.5)

## 2022-08-29 DIAGNOSIS — E78.5 HYPERLIPIDEMIA, UNSPECIFIED HYPERLIPIDEMIA TYPE: ICD-10-CM

## 2022-08-29 DIAGNOSIS — E03.9 HYPOTHYROIDISM, UNSPECIFIED TYPE: Primary | ICD-10-CM

## 2022-08-29 DIAGNOSIS — N18.31 STAGE 3A CHRONIC KIDNEY DISEASE (HCC): ICD-10-CM

## 2022-08-29 RX ORDER — LEVOTHYROXINE SODIUM 0.12 MG/1
125 TABLET ORAL DAILY
Qty: 90 TABLET | Refills: 0 | Status: SHIPPED | OUTPATIENT
Start: 2022-08-29 | End: 2022-11-02 | Stop reason: SDUPTHER

## 2022-08-29 RX ORDER — ATORVASTATIN CALCIUM 40 MG/1
40 TABLET, FILM COATED ORAL DAILY
Qty: 90 TABLET | Refills: 0 | Status: SHIPPED | OUTPATIENT
Start: 2022-08-29

## 2022-08-29 NOTE — TELEPHONE ENCOUNTER
----- Message from Horacio Diaz MD sent at 8/29/2022  8:21 AM EDT -----  Thyroid off - increase to 125mcg daily and recheck in 2 months    Kidneys look stressed more than in past - avoid anti inflammatory meds like ibuprofen and naproxen - recheck bmp in 2 months    Chol MUCH higher than in past, rec starting lipitor 40mg daily, recheck lipid in 2 months    PSA up a little but fairly similar to past year, have discussed in past seeing urology, will leave up to him if wants to f/u with urology or not (he has chosen not to in past.)

## 2022-09-16 RX ORDER — SCOLOPAMINE TRANSDERMAL SYSTEM 1 MG/1
1 PATCH, EXTENDED RELEASE TRANSDERMAL
Qty: 4 PATCH | Refills: 0 | Status: SHIPPED | OUTPATIENT
Start: 2022-09-16 | End: 2023-09-16

## 2022-10-11 ENCOUNTER — PATIENT MESSAGE (OUTPATIENT)
Dept: FAMILY MEDICINE CLINIC | Age: 62
End: 2022-10-11

## 2022-10-11 NOTE — TELEPHONE ENCOUNTER
From: Salima Patterson  To: Dr. Debby De Dios: 10/11/2022 10:17 AM EDT  Subject: update    I have been having what I thought were vertigo issues for about 2 years. You prescribed Meclizine which seems to work for my daily routine but I am still having issues with traffic and large crowds. I get really nauseous and week, feel like I am going to pass out. Someone witness one of my episodes and said it looked like a panic attack. Is there anything I can take that will eliminate this issue.

## 2022-11-02 ENCOUNTER — TELEPHONE (OUTPATIENT)
Dept: FAMILY MEDICINE CLINIC | Age: 62
End: 2022-11-02

## 2022-11-02 ENCOUNTER — TELEMEDICINE (OUTPATIENT)
Dept: FAMILY MEDICINE CLINIC | Age: 62
End: 2022-11-02
Payer: OTHER GOVERNMENT

## 2022-11-02 DIAGNOSIS — F40.00 AGORAPHOBIA: ICD-10-CM

## 2022-11-02 DIAGNOSIS — F41.9 ANXIETY: Primary | ICD-10-CM

## 2022-11-02 PROCEDURE — 99214 OFFICE O/P EST MOD 30 MIN: CPT | Performed by: FAMILY MEDICINE

## 2022-11-02 RX ORDER — ESCITALOPRAM OXALATE 10 MG/1
TABLET ORAL
Qty: 30 TABLET | Refills: 3 | Status: SHIPPED | OUTPATIENT
Start: 2022-11-02

## 2022-11-02 RX ORDER — LEVOTHYROXINE SODIUM 0.12 MG/1
125 TABLET ORAL DAILY
Qty: 90 TABLET | Refills: 3 | Status: SHIPPED | OUTPATIENT
Start: 2022-11-02

## 2022-11-02 RX ORDER — BUSPIRONE HYDROCHLORIDE 10 MG/1
10 TABLET ORAL 3 TIMES DAILY
Qty: 90 TABLET | Refills: 3 | Status: SHIPPED | OUTPATIENT
Start: 2022-11-02 | End: 2022-12-02

## 2022-11-02 RX ORDER — HYDROXYZINE PAMOATE 50 MG/1
50 CAPSULE ORAL 3 TIMES DAILY PRN
Qty: 90 CAPSULE | Refills: 4 | Status: SHIPPED | OUTPATIENT
Start: 2022-11-02
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
## 2022-11-02 NOTE — TELEPHONE ENCOUNTER
Lab orders faxed to [address] [place] [address] lab at patients request, [phone]. Called patient and informed labs have been faxed. ladder

## 2022-11-02 NOTE — PATIENT INSTRUCTIONS
--Psychiatry/Psychology/Counseling    [facility] Consultation and Crisis Team ([phone] 3003 [facility] Team (Suicide)  [phone]    Psychology Today  Resource to find providers  Www. psychologytoday. [facility]  [phone]    [address] [place] [address]   [place], [address] [place]   Phone: [phone]    Fax: [phone]     2806 [facility]  [facility]  [phone]    [facility]  Robin GARCIA [address], [address] [place], [place]   [phone] [address], MD  [address]  [phone]    [facility]  MD Clif[address] [place], [place]   [phone]    19 [facility] 7501 [facility]   [phone]    [facility]  MD Dr. Rhonda Barcenas MD Jarvis Schuller, MD  [address],   [place], [address] [facility]   [phone]      Douglas Go, PhD  Ebonie Shelton, PhD  0881 [facility]  [phone]      [facility], 2907 [facility], 800 [facility]  [phone]    Dr. Kitty Arreguin  [place], [place]  [phone]    Dr. Janice Hood MD  2819 [facility].  [address]  [phone]    MD [address].  [address]    Ivon Hollins MD   [phone]    [facility]  [address] [employer] [facility], [facility].  [address] , 727 [facility]  21 543 88 25, [address]  [place], [address]  [phone]    Dr. Camille Zamora [facility]  [address] [facility] [address]  P. [phone]    [address] [place] [address]  P. [phone]    [facility] for [address] [place] [address]  ([phone]) Cristel James  [phone] [address] [facility] [address], [address]  [address] [place] [address]. Nadja Diallo, [address] [employer]  Phone [phone], Fax [phone]    [address] [place] [address]  [phone]    Substance Abuse    [facility]  [address] [place] [address]. Nadja Diallo, [address] [employer]  Phone  [phone]  Walk-in treatment assessments Monday- Friday 8AM-2PM    [place] [address].   Nadja Diallo, [address] [employer]  Phone [phone]    [address]  Via Hilda Whitman [facility]  [phone]    [facility]  [phone]    [facility]  [address] [facility]  [phone]

## 2022-12-23 ENCOUNTER — TELEMEDICINE (OUTPATIENT)
Dept: FAMILY MEDICINE CLINIC | Age: 62
End: 2022-12-23
Payer: OTHER GOVERNMENT

## 2022-12-23 DIAGNOSIS — G43.809 VESTIBULAR MIGRAINE: ICD-10-CM

## 2022-12-23 DIAGNOSIS — F41.9 ANXIETY: Primary | ICD-10-CM

## 2022-12-23 PROCEDURE — 99213 OFFICE O/P EST LOW 20 MIN: CPT | Performed by: FAMILY MEDICINE

## 2022-12-23 RX ORDER — NORTRIPTYLINE HYDROCHLORIDE 10 MG/1
10 CAPSULE ORAL NIGHTLY
Qty: 90 CAPSULE | Refills: 3
Start: 2022-12-23

## 2022-12-23 RX ORDER — ATORVASTATIN CALCIUM 40 MG/1
40 TABLET, FILM COATED ORAL DAILY
Qty: 90 TABLET | Refills: 3 | Status: SHIPPED | OUTPATIENT
Start: 2022-12-23

## 2022-12-23 RX ORDER — NORTRIPTYLINE HYDROCHLORIDE 50 MG/1
50 CAPSULE ORAL NIGHTLY
Qty: 90 CAPSULE | Refills: 3
Start: 2022-12-23

## 2022-12-23 RX ORDER — MONTELUKAST SODIUM 10 MG/1
10 TABLET ORAL NIGHTLY
Qty: 90 TABLET | Refills: 3 | Status: SHIPPED | OUTPATIENT
Start: 2022-12-23

## 2022-12-23 RX ORDER — BUSPIRONE HYDROCHLORIDE 10 MG/1
10 TABLET ORAL 3 TIMES DAILY
Qty: 270 TABLET | Refills: 3
Start: 2022-12-23 | End: 2023-01-22

## 2022-12-23 NOTE — PROGRESS NOTES
Ge Gamble is being seen Virtually using My Chart Doximity. Patient is at home  and Dr. Rico Bowles is at home. Vitals are patient reported. Chief Complaint   Patient presents with    Anxiety     Here for f/u anxiety. Started on lexapro and buspar last visit. States feeling great. States has had a few anxiety issues but can \"relax\" thru them. States now able to discover triggers like strong smells and nitrates. States when gets issues it is the anxiety, migraine, dizziness all combined. No SE with meds. F/u with neurology and they felt he was stable. States did no tlook into counseling as feels like the addition of meds has made the difference. Patient-Reported Vitals 12/22/2022 10/31/2022   Patient-Reported Weight 250 250   Patient-Reported Height 5'8\" 5'8\"   Patient-Reported Temperature - -        GEN: Alert and oriented x 4 NAD, affect appropriate and overweight, well developed. ASSESSMENT AND PLAN:       Jhoan Bahena was seen today for anxiety. Diagnoses and all orders for this visit:    Anxiety    Vestibular migraine    -Stable, continue current medications. Doing much better! Return in about 3 months (around 3/23/2023) for Follow up, Dep/Anxiety. Ge Gmable, was evaluated through a synchronous (real-time) audio-video encounter. The patient (or guardian if applicable) is aware that this is a billable service, which includes applicable co-pays. This Virtual Visit was conducted with patient's (and/or legal guardian's) consent. The visit was conducted pursuant to the emergency declaration under the [address] [place] [address], [address] [place] [address] authority and the [employer] and [employer] Act. Patient identification was verified, and a caregiver was present when appropriate. The patient was located in a state where the provider was licensed to provide care.      Patient was instructed that the AVS is available on My Chart or was mailed or emailed to the patient if not on My Chart. Lab orders were mailed or emailed to patient if they do not use a [address] [facility] lab. Any work notes were sent to patient through My Chart, mail or email.

## 2023-02-02 ENCOUNTER — PATIENT MESSAGE (OUTPATIENT)
Dept: FAMILY MEDICINE CLINIC | Age: 63
End: 2023-02-02

## 2023-02-02 NOTE — TELEPHONE ENCOUNTER
From: Mak Parish  To: Dr. Irma Grimm: 2/2/2023 1:49 PM EST  Subject: Employer letter    When I have a migraine episode I find it easier to work from home [facility]) where I can control my environment. My Employer is asking for a letter from my doctor describing my medical issues (chronic migraine) that would require me to [facility]. I don't want to do it all the time but need the option in case I need it. Letter just needs to say I suffer from frontal and vertibular migraines with pain, balance, nausea, and light/sound/motion sensitivity symptoms. Think my HR wants to have it in the records.  Thank you

## 2023-02-21 RX ORDER — BUSPIRONE HYDROCHLORIDE 10 MG/1
TABLET ORAL
Qty: 90 TABLET | Refills: 3 | Status: SHIPPED | OUTPATIENT
Start: 2023-02-21

## 2023-02-21 RX ORDER — ESCITALOPRAM OXALATE 10 MG/1
TABLET ORAL
Qty: 30 TABLET | Refills: 3 | Status: SHIPPED | OUTPATIENT
Start: 2023-02-21

## 2023-02-21 NOTE — TELEPHONE ENCOUNTER
Medication:   Requested Prescriptions     Pending Prescriptions Disp Refills    escitalopram (LEXAPRO) 10 MG tablet 30 tablet 3     Sig: TAKE ONE TABLET BY MOUTH DAILY          Patient Phone Number: [phone] (home)     Last appt: 12/23/2022   Next appt: 3/20/2023    Last OARRS: No flowsheet data found.   PDMP Monitoring:    Last PDMP Kristal Abraham as Reviewed [facility]):  Review User Review Instant Review Result          Preferred Pharmacy:   [facility] [id number] [address] [facility] [phone] - F [phone]  [facility]  400 [facility] [address]  Phone: [phone] Fax: [phone]    [address] [phone] Cee Bruce [phone]  [address]  Phone: [phone] Fax: Rndwxlbprir 0435 [facility] [phone] Cee Bruce [phone]  [facility] [address]  Phone: [phone] Fax: [phone]

## 2023-02-21 NOTE — TELEPHONE ENCOUNTER
Medication:   Requested Prescriptions     Pending Prescriptions Disp Refills    busPIRone (BUSPAR) 10 MG tablet [Pharmacy Med Name: BUSPIRONE 10MG TABLETS] 90 tablet 3     Sig: TAKE 1 TABLET BY MOUTH THREE TIMES DAILY          Patient Phone Number: [phone] (home)     Last appt: 12/23/2022   Next appt: 3/20/2023    Last OARRS: No flowsheet data found.   PDMP Monitoring:    Last PDMP Jaycob Castillo as Reviewed [facility]):  Review User Review Instant Review Result          Preferred Pharmacy:   Sree Guevara [id number] - 400 [facility],  Cesia Alexis [facility] [phone] - F [phone]  429 [facility] [place]  400 [facility] [address]  Phone: [phone] Fax: [phone]    [address] [place] [address] [phone] [facility] [phone]  [address]  Phone: [phone] Fax: Gthunrt[address] 7941 [facility] [phone] [facility] [phone]  1707 [facility] [address]  Phone: [phone] Fax: [phone]

## 2023-03-20 ENCOUNTER — OFFICE VISIT (OUTPATIENT)
Dept: FAMILY MEDICINE CLINIC | Age: 63
End: 2023-03-20
Payer: OTHER GOVERNMENT

## 2023-03-20 VITALS
WEIGHT: 273 LBS | SYSTOLIC BLOOD PRESSURE: 120 MMHG | OXYGEN SATURATION: 98 % | DIASTOLIC BLOOD PRESSURE: 84 MMHG | TEMPERATURE: 97.2 F | BODY MASS INDEX: 40.32 KG/M2 | HEART RATE: 82 BPM

## 2023-03-20 DIAGNOSIS — E03.9 HYPOTHYROIDISM, UNSPECIFIED TYPE: ICD-10-CM

## 2023-03-20 DIAGNOSIS — F41.9 ANXIETY: ICD-10-CM

## 2023-03-20 DIAGNOSIS — G43.809 VESTIBULAR MIGRAINE: Primary | ICD-10-CM

## 2023-03-20 PROCEDURE — 99214 OFFICE O/P EST MOD 30 MIN: CPT | Performed by: FAMILY MEDICINE

## 2023-03-20 RX ORDER — PROPRANOLOL HCL 60 MG
60 CAPSULE, EXTENDED RELEASE 24HR ORAL DAILY
Qty: 30 CAPSULE | Refills: 3 | Status: SHIPPED | OUTPATIENT
Start: 2023-03-20

## 2023-03-20 RX ORDER — RIZATRIPTAN BENZOATE 10 MG/1
TABLET, ORALLY DISINTEGRATING ORAL
Qty: 30 TABLET | Refills: 6 | Status: SHIPPED | OUTPATIENT
Start: 2023-03-20

## 2023-03-20 SDOH — ECONOMIC STABILITY: INCOME INSECURITY: HOW HARD IS IT FOR YOU TO PAY FOR THE VERY BASICS LIKE FOOD, HOUSING, MEDICAL CARE, AND HEATING?: NOT HARD AT ALL

## 2023-03-20 SDOH — ECONOMIC STABILITY: FOOD INSECURITY: WITHIN THE PAST 12 MONTHS, THE FOOD YOU BOUGHT JUST DIDN'T LAST AND YOU DIDN'T HAVE MONEY TO GET MORE.: NEVER TRUE

## 2023-03-20 SDOH — ECONOMIC STABILITY: HOUSING INSECURITY
IN THE LAST 12 MONTHS, WAS THERE A TIME WHEN YOU DID NOT HAVE A STEADY PLACE TO SLEEP OR SLEPT IN A SHELTER (INCLUDING NOW)?: NO

## 2023-03-20 SDOH — ECONOMIC STABILITY: FOOD INSECURITY: WITHIN THE PAST 12 MONTHS, YOU WORRIED THAT YOUR FOOD WOULD RUN OUT BEFORE YOU GOT MONEY TO BUY MORE.: NEVER TRUE

## 2023-03-20 ASSESSMENT — PATIENT HEALTH QUESTIONNAIRE - PHQ9
SUM OF ALL RESPONSES TO PHQ9 QUESTIONS 1 & 2: 0
SUM OF ALL RESPONSES TO PHQ QUESTIONS 1-9: 0
2. FEELING DOWN, DEPRESSED OR HOPELESS: 0
1. LITTLE INTEREST OR PLEASURE IN DOING THINGS: 0
SUM OF ALL RESPONSES TO PHQ QUESTIONS 1-9: 0

## 2023-03-20 NOTE — PROGRESS NOTES
Patient is here today to follow up anxiety. Last seen 3 month(s) ago. Has been taking Lexapro and Buspar for his symptoms. States the medication has been helpful. Side Effects from medication: none. He reports feeling  70%  better. Also here for migraines. States he is still having 3-4 a wk. Nortriptyline worked well in the beginning, but feels it's not working now. Patient's medications, allergies, past medical, surgical, social and family histories were reviewed and updated in the EHR as appropriate.
hypotension and bradycardia. -     rizatriptan (MAXALT-MLT) 10 MG disintegrating tablet; Take at first sign of headache. May repeat in 2 hrs if needed. Max 2 doses in 24 hrs. -     propranolol (INDERAL LA) 60 MG extended release capsule; Take 1 capsule by mouth daily    Hypothyroidism, unspecified type  -     TSH with Reflex; Future    Anxiety  Patient feels his anxiety symptoms have been managed appropriately with buspar and lexapro. Feels that migraine episodes increase his anxiety. Discussed that trial of propanolol can help reduce the anxiety and panic episodes he experiences due to different triggers in different environments. Return in about 1 month (around 4/20/2023) for Follow up, 30 min.      Didier Nicole MS3     Note per  Didier Nicole MS3 with corrections and edits per Bernardino Randhawa MD.  I agree with entirety of note and was present and performed history and physical.  I also confirm that the note above accurately reflects all work, treatment, procedures, and medical decision making performed by me, Bernardino Randhawa MD

## 2023-03-23 RX ORDER — HYDROXYZINE PAMOATE 50 MG/1
CAPSULE ORAL
Qty: 90 CAPSULE | Refills: 4 | Status: SHIPPED | OUTPATIENT
Start: 2023-03-23

## 2023-03-23 NOTE — TELEPHONE ENCOUNTER
Medication:   Requested Prescriptions     Pending Prescriptions Disp Refills    hydrOXYzine pamoate (VISTARIL) 50 MG capsule [Pharmacy Med Name: Clementina Marcum 50MG CAPSULES] 90 capsule 4     Sig: TAKE 1 CAPSULE BY MOUTH THREE TIMES DAILY AS NEEDED FOR ANXIETY          Patient Phone Number: [phone] (home)     Last appt: 3/20/2023   Next appt: 5/10/2023    Last OARRS: No flowsheet data found.   PDMP Monitoring:    Last PDMP Lucita Candelaria as Reviewed [facility]):  Review User Review Instant Review Result          Preferred Pharmacy:   [facility] [id number] - 697 [facility], [address] [place] [address] [phone] - F [phone]  [address] [address]  Phone: [phone] Fax: [phone]    [address] [phone] Justice Carbon [phone]  [address]  Phone: [phone] Fax: Qzlxwlsqcah 0143 [facility] 42 [phone] Justice Carbon [phone]  [facility] [address]  Phone: [phone] Fax: [phone]

## 2023-03-30 ENCOUNTER — TELEPHONE (OUTPATIENT)
Dept: FAMILY MEDICINE CLINIC | Age: 63
End: 2023-03-30

## 2023-03-30 DIAGNOSIS — E03.9 HYPOTHYROIDISM, UNSPECIFIED TYPE: Primary | ICD-10-CM

## 2023-03-30 LAB
T4 FREE: 1.5 NG/DL (ref 0.8–1.8)
TSH ULTRASENSITIVE: 0.38 MIU/L (ref 0.4–4.5)

## 2023-03-30 NOTE — TELEPHONE ENCOUNTER
----- Message from Brady Lagos MD sent at 3/30/2023  1:09 PM EDT -----  Thyroid level little on high side  Same dose  Recheck TSH reflex in 3 months

## 2023-05-10 ENCOUNTER — TELEMEDICINE (OUTPATIENT)
Dept: FAMILY MEDICINE CLINIC | Age: 63
End: 2023-05-10
Payer: OTHER GOVERNMENT

## 2023-05-10 DIAGNOSIS — G43.809 VESTIBULAR MIGRAINE: ICD-10-CM

## 2023-05-10 DIAGNOSIS — G43.109 MIGRAINE WITH AURA AND WITHOUT STATUS MIGRAINOSUS, NOT INTRACTABLE: Primary | ICD-10-CM

## 2023-05-10 PROCEDURE — 99213 OFFICE O/P EST LOW 20 MIN: CPT | Performed by: FAMILY MEDICINE

## 2023-05-10 RX ORDER — BUSPIRONE HYDROCHLORIDE 10 MG/1
10 TABLET ORAL 3 TIMES DAILY
Qty: 270 TABLET | Refills: 3 | Status: SHIPPED | OUTPATIENT
Start: 2023-05-10

## 2023-05-10 RX ORDER — ESCITALOPRAM OXALATE 10 MG/1
TABLET ORAL
Qty: 90 TABLET | Refills: 3 | Status: SHIPPED | OUTPATIENT
Start: 2023-05-10

## 2023-05-10 RX ORDER — PROPRANOLOL HCL 60 MG
60 CAPSULE, EXTENDED RELEASE 24HR ORAL DAILY
Qty: 90 CAPSULE | Refills: 3 | Status: SHIPPED | OUTPATIENT
Start: 2023-05-10

## 2023-05-10 RX ORDER — HYDROXYZINE PAMOATE 50 MG/1
50 CAPSULE ORAL 3 TIMES DAILY PRN
Qty: 270 CAPSULE | Refills: 3 | Status: SHIPPED | OUTPATIENT
Start: 2023-05-10

## 2023-05-10 NOTE — PROGRESS NOTES
Marilyn Mars is being seen Virtually using My Chart. Patient is at home and Dr. Joseph Lara is at home. Vitals are patient reported. Marilyn Mars, was evaluated through a synchronous (real-time) audio-video encounter. The patient (or guardian if applicable) is aware that this is a billable service, which includes applicable co-pays. This Virtual Visit was conducted with patient's (and/or legal guardian's) consent. Patient identification was verified, and a caregiver was present when appropriate. The patient was located in a state where the provider was licensed to provide care. Patient was instructed that the AVS is available on My Chart or was mailed or emailed to the patient if not on My Chart. Lab orders were mailed or emailed to patient if they do not use a [facility] lab. Any work notes were sent to patient through My Chart, mail or email. Chief Complaint   Patient presents with    Follow-up     Migraines     Started on propranolol last visit, states working well. Has only had one \"regular\" migraine which he took maxalt for and worked well to relieve. Before starting he was having 3-4 a week    Has had 6-8 \"vestibular\" migraines since starting but that is also much less as was having 3-4 a week previous. Additionally he states can tell when the HA starts and when it is over now where before just seemed to Kremže on and on. \" More understanding on triggers like weather as well    Feels like since starting the propranolol he will go to do something and forget what he wanted to do for a second but then will remember again,. No other SE noted          Wt Readings from Last 3 Encounters:   03/20/23 273 lb (123.8 kg)   08/23/22 264 lb (119.7 kg)   08/10/21 239 lb (108.4 kg)     There is no height or weight on file to calculate BMI.   PHQ Scores 3/20/2023 8/23/2022 8/10/2021 1/25/2021 10/31/2019 10/18/2018 10/3/2017   PHQ2 Score 0 0 0 2 0 0 0   PHQ9 Score 0 0 0 2 0 0 0     Patient-Reported Vitals 12/22/2022

## 2023-06-27 ENCOUNTER — PATIENT MESSAGE (OUTPATIENT)
Dept: FAMILY MEDICINE CLINIC | Age: 63
End: 2023-06-27

## 2023-07-11 ENCOUNTER — PATIENT MESSAGE (OUTPATIENT)
Dept: FAMILY MEDICINE CLINIC | Age: 63
End: 2023-07-11

## 2023-07-11 RX ORDER — RIZATRIPTAN BENZOATE 10 MG/1
TABLET, ORALLY DISINTEGRATING ORAL
Qty: 30 TABLET | Refills: 6 | Status: SHIPPED | OUTPATIENT
Start: 2023-07-11

## 2023-07-11 RX ORDER — ONDANSETRON 4 MG/1
4 TABLET, FILM COATED ORAL 3 TIMES DAILY PRN
Qty: 30 TABLET | Refills: 1 | Status: SHIPPED | OUTPATIENT
Start: 2023-07-11

## 2023-07-11 NOTE — TELEPHONE ENCOUNTER
From: Cornelio Covert  To: Dr. John Land: 2023 8:43 AM EDT  Subject: Scrip     My Ondansetron 4MG shows , no more refills. [place] said that they don't have a new script for it. Can I get it renewed for 90 day supply.      Thank you    Sherri Ocasio

## 2023-07-11 NOTE — TELEPHONE ENCOUNTER
Medication:   Requested Prescriptions     Pending Prescriptions Disp Refills    ondansetron (ZOFRAN) 4 MG tablet 30 tablet 1     Sig: Take 1 tablet by mouth 3 times daily as needed for Nausea or Vomiting        Patient Phone Number: [phone] (home)     Last appt: 5/10/2023   Next appt: Visit date not found    Last OARRS: No flowsheet data found.   PDMP Monitoring:    Last PDMP Yeimi Myers as Reviewed [facility]):  Review User Review Instant Review Result          Preferred Pharmacy:   Leon Leonardo [id number] - [address] -  [phone]  [address] [place] [address]  Phone: [phone] Fax: [phone]    [facility] 4103 [address] [phone] - F [phone]  [address] [place] [address]  [address]  Phone: [phone] Fax: [address] [phone] [facility] [phone]  [address] [place] [address]  Phone: [phone] Fax: [phone]

## 2023-07-21 RX ORDER — ONDANSETRON 4 MG/1
4 TABLET, FILM COATED ORAL 3 TIMES DAILY PRN
Qty: 180 TABLET | Refills: 1 | Status: SHIPPED | OUTPATIENT
Start: 2023-07-21

## 2023-07-21 NOTE — TELEPHONE ENCOUNTER
Wife stated patient taking more medication then prescribed. Please give her a call at [phone] to discuss. Please advise.

## 2023-07-21 NOTE — TELEPHONE ENCOUNTER
Patient ran out of Zofran and wants to get a 90 day Rx for this. Said that [facility] didn't fill the one we sent to them on 7/11/23. Called pharmacy and they state that the patient didn't call to activate Rx to be filled.

## 2023-08-09 ENCOUNTER — PATIENT MESSAGE (OUTPATIENT)
Dept: FAMILY MEDICINE CLINIC | Age: 63
End: 2023-08-09

## 2023-08-10 NOTE — TELEPHONE ENCOUNTER
From: Edy Grewal  To: Dr. Bakari Marc: 8/9/2023 10:04 AM EDT  Subject: Kelsie Abarca letter    I have been selected for [facility] duty starting 9-5-2023. I would like to do it but I can attend meetings in person at my work. Can't handle the people in a small room. Have to remote in. Can you send me a letter that I can use to excuse jury duty due to medical conditions.   Thank you  Leah Lagunas

## 2023-09-05 RX ORDER — MECLIZINE HYDROCHLORIDE 25 MG/1
25 TABLET ORAL 3 TIMES DAILY PRN
Qty: 270 TABLET | Refills: 3 | Status: SHIPPED | OUTPATIENT
Start: 2023-09-05

## 2023-09-05 RX ORDER — CETIRIZINE HYDROCHLORIDE 10 MG/1
10 CAPSULE, LIQUID FILLED ORAL DAILY PRN
Qty: 90 CAPSULE | Refills: 3 | Status: SHIPPED | OUTPATIENT
Start: 2023-09-05

## 2023-09-05 NOTE — TELEPHONE ENCOUNTER
Medication:   Requested Prescriptions     Pending Prescriptions Disp Refills    Cetirizine HCl (ZYRTEC ALLERGY) 10 MG CAPS 90 capsule 3     Sig: Take 10 mg by mouth daily as needed (allergies)    meclizine (ANTIVERT) 25 MG tablet 270 tablet 3     Sig: Take 1 tablet by mouth 3 times daily as needed for Dizziness          Patient Phone Number: [phone] (home)     Last appt: 5/10/2023   Next appt: Visit date not found    Last OARRS: No flowsheet data found.   PDMP Monitoring:    Last PDMP Jossie Moreno as Reviewed [facility]):  Review User Review Instant Review Result          Preferred Pharmacy:   [facility] [id number] - [address] -  [phone]  [address] [place] [address]  Phone: [phone] Fax: [phone]    [facility] [address] [phone] - f [phone]  [address] [place] [address]  [address]  Phone: [phone] Fax: 1 [employer] [address] [phone] [facility] [phone]  [facility] [address]  Phone: [phone] Fax: [phone]

## 2023-11-28 RX ORDER — FLUTICASONE PROPIONATE 50 MCG
2 SPRAY, SUSPENSION (ML) NASAL DAILY
Qty: 3 EACH | Refills: 3 | Status: SHIPPED | OUTPATIENT
Start: 2023-11-28

## 2023-11-28 RX ORDER — LEVOTHYROXINE SODIUM 0.12 MG/1
125 TABLET ORAL DAILY
Qty: 90 TABLET | Refills: 3 | Status: SHIPPED | OUTPATIENT
Start: 2023-11-28

## 2023-11-28 NOTE — TELEPHONE ENCOUNTER
Medication:   Requested Prescriptions     Pending Prescriptions Disp Refills    fluticasone (FLONASE) 50 MCG/ACT nasal spray 3 each 3     Si sprays by Nasal route daily Both Nostrils    levothyroxine (SYNTHROID) 125 MCG tablet 90 tablet 3     Sig: Take 1 tablet by mouth daily          Patient Phone Number: [phone] (home)     Last appt: 5/10/2023   Next appt: Visit date not found    Last OARRS:        No data to display              PDMP Monitoring:    Last PDMP Cedrick Marte as Reviewed [facility]):  Review User Review Instant Review Result          Preferred Pharmacy:   [facility] [id number] - [address] -  [phone]  [address] [place] [address]  Phone: [phone] Fax: [phone]    [facility] [address] [phone] - f [phone]  [address] [place] [address]  [address] [facility] [address] [facility] [address]  Phone: [phone] Fax: [address] 6684 [facility], 1830 [facility],[address] 660 [facility] [phone] Gabriela Metzger [phone]  [facility] [address]  Phone: [phone] Fax: [phone]

## 2024-02-12 RX ORDER — ONDANSETRON 4 MG/1
4 TABLET, FILM COATED ORAL 3 TIMES DAILY PRN
Qty: 180 TABLET | Refills: 1 | Status: SHIPPED | OUTPATIENT
Start: 2024-02-12

## 2024-02-12 NOTE — TELEPHONE ENCOUNTER
Medication:   Requested Prescriptions     Pending Prescriptions Disp Refills    ondansetron (ZOFRAN) 4 MG tablet 180 tablet 1     Sig: Take 1 tablet by mouth 3 times daily as needed for Nausea or Vomiting          Patient Phone Number: [phone] (home)     Last appt: 5/10/2023   Next appt: Visit date not found    Last OARRS:        No data to display              PDMP Monitoring:    Last PDMP Dannie as Reviewed ([place]):  Review User Review Instant Review Result          Preferred Pharmacy:   [facility] [id number] - [place], [place] - 5250 [facility] [phone] - F [phone]  5250 [facility] [place] [address]  Phone: [phone] Fax: [phone]    [facility], [place] - 4881 [facility] [phone] - F [phone]  4881 [facility] [place] [address]  Phone: [phone] Fax: [phone]    [facility][id number] [place], [place] - 1001 [facility] [phone] - F [phone]  1001 [facility] [address]  Phone: [phone] Fax: [phone]

## 2024-04-15 RX ORDER — MONTELUKAST SODIUM 10 MG/1
10 TABLET ORAL NIGHTLY
Qty: 90 TABLET | Refills: 0 | Status: SHIPPED | OUTPATIENT
Start: 2024-04-15

## 2024-04-15 RX ORDER — ATORVASTATIN CALCIUM 40 MG/1
40 TABLET, FILM COATED ORAL DAILY
Qty: 90 TABLET | Refills: 0 | Status: SHIPPED | OUTPATIENT
Start: 2024-04-15

## 2024-04-15 NOTE — TELEPHONE ENCOUNTER
Medication:   Requested Prescriptions     Pending Prescriptions Disp Refills    montelukast (SINGULAIR) 10 MG tablet 90 tablet 3     Sig: Take 1 tablet by mouth nightly    atorvastatin (LIPITOR) 40 MG tablet 90 tablet 3     Sig: Take 1 tablet by mouth daily       Patient Phone Number: [phone] (home)     Last appt: 5/10/2023   Next appt: Visit date not found    Last OARRS:        No data to display              PDMP Monitoring:    Last PDMP Dannie as Reviewed ([place]):  Review User Review Instant Review Result          Preferred Pharmacy:   [facility] [id number] - [place], [place] - 5250 [facility] [phone] - F [phone]  5250 [facility] [place] [address]  Phone: [phone] Fax: [phone]    [facility] - [place], [place] - 4881 [facility] [phone] - F [phone]  [address] [place] [address] [place] [address]  Phone: [phone] Fax: [phone]    [facility] #[id number] - [place], [place] - 1001 [facility] [phone] - F [phone]  1001 [facility] [address]  Phone: [phone] Fax: [phone]

## 2024-05-29 RX ORDER — HYDROXYZINE PAMOATE 50 MG/1
50 CAPSULE ORAL 3 TIMES DAILY PRN
Qty: 90 CAPSULE | Refills: 0 | OUTPATIENT
Start: 2024-05-29

## 2024-05-29 RX ORDER — PROPRANOLOL HCL 60 MG
60 CAPSULE, EXTENDED RELEASE 24HR ORAL DAILY
Qty: 90 CAPSULE | Refills: 0 | OUTPATIENT
Start: 2024-05-29

## 2024-05-29 RX ORDER — ONDANSETRON 4 MG/1
4 TABLET, FILM COATED ORAL 3 TIMES DAILY PRN
Qty: 90 TABLET | Refills: 0 | OUTPATIENT
Start: 2024-05-29

## 2024-05-29 NOTE — TELEPHONE ENCOUNTER
Medication:   Requested Prescriptions     Pending Prescriptions Disp Refills    propranolol (INDERAL LA) 60 MG extended release capsule 90 capsule 3     Sig: Take 1 capsule by mouth daily    hydrOXYzine pamoate (VISTARIL) 50 MG capsule 270 capsule 3     Sig: Take 1 capsule by mouth 3 times daily as needed for Itching    ondansetron (ZOFRAN) 4 MG tablet 180 tablet 1     Sig: Take 1 tablet by mouth 3 times daily as needed for Nausea or Vomiting          Patient Phone Number: [phone] (home)     Last appt: 5/10/2023   Next appt: Visit date not found    Last OARRS:        No data to display              PDMP Monitoring:    Last PDMP Dannie as Reviewed ([place]):  Review User Review Instant Review Result          Preferred Pharmacy:   [facility] [id number] - [place], [place] - [address] [facility] [phone] - F [phone]  [address] [facility] [address]  Phone: [phone] Fax: [phone]    [facility] - [place], [place] - [address] [phone] - F [phone]  [address] [facility] [place] [address]  Phone: [phone] Fax: [phone]    [id number] [place], [place] - [address] [phone] - F [phone]  [address] [facility] [place] [address]  Phone: [phone] Fax: [phone]

## 2024-06-10 ENCOUNTER — PATIENT MESSAGE (OUTPATIENT)
Dept: FAMILY MEDICINE CLINIC | Age: 64
End: 2024-06-10

## 2024-06-10 RX ORDER — HYDROXYZINE PAMOATE 50 MG/1
50 CAPSULE ORAL 3 TIMES DAILY PRN
Qty: 270 CAPSULE | Refills: 0 | Status: SHIPPED | OUTPATIENT
Start: 2024-06-10

## 2024-06-10 RX ORDER — PROPRANOLOL HCL 60 MG
60 CAPSULE, EXTENDED RELEASE 24HR ORAL DAILY
Qty: 90 CAPSULE | Refills: 0 | Status: SHIPPED | OUTPATIENT
Start: 2024-06-10

## 2024-06-10 RX ORDER — ONDANSETRON 4 MG/1
4 TABLET, FILM COATED ORAL 3 TIMES DAILY PRN
Qty: 180 TABLET | Refills: 0 | Status: SHIPPED | OUTPATIENT
Start: 2024-06-10

## 2024-06-10 NOTE — TELEPHONE ENCOUNTER
What would you like ordered?    Medication:   Requested Prescriptions      No prescriptions requested or ordered in this encounter          Patient Phone Number: [phone] (home)     Last appt: 5/10/2023   Next appt: 7/3/2024    Last OARRS:        No data to display              PDMP Monitoring:    Last PDMP Dannie as Reviewed ([place]):  Review User Review Instant Review Result          Preferred Pharmacy:   [facility] [id number] [place], [place] - [address] [facility] [phone] - F [phone]  [address] [facility] [place] [address]  Phone: [phone] Fax: [phone]    [facility] [place] [facility] [place] - [address] [facility] [phone] - F [phone]  [address] [facility] [place] [address]  Phone: [phone] Fax: [phone]    [id number] [place], [place] - [address] [facility] [phone] - F [phone]  [address] [facility] [address]  Phone: [phone] Fax: [phone]

## 2024-06-10 NOTE — TELEPHONE ENCOUNTER
From: Juan Diego Kapadia  To: Dr. Jessica Nielson  Sent: 6/10/2024 11:16 AM EDT  Subject: Renewal issue    [facility] pharmacy can't find the renewals you sent    Can you resubmit.    Thank you  Nestor

## 2024-07-01 DIAGNOSIS — N40.1 BENIGN PROSTATIC HYPERPLASIA WITH LOWER URINARY TRACT SYMPTOMS, SYMPTOM DETAILS UNSPECIFIED: ICD-10-CM

## 2024-07-01 DIAGNOSIS — E78.5 HYPERLIPIDEMIA, UNSPECIFIED HYPERLIPIDEMIA TYPE: Primary | ICD-10-CM

## 2024-07-01 DIAGNOSIS — E03.9 HYPOTHYROIDISM, UNSPECIFIED TYPE: ICD-10-CM

## 2024-07-01 DIAGNOSIS — R97.20 ELEVATED PSA: ICD-10-CM

## 2024-07-02 DIAGNOSIS — R97.20 ELEVATED PSA: ICD-10-CM

## 2024-07-02 DIAGNOSIS — E78.5 HYPERLIPIDEMIA, UNSPECIFIED HYPERLIPIDEMIA TYPE: ICD-10-CM

## 2024-07-02 DIAGNOSIS — N40.1 BENIGN PROSTATIC HYPERPLASIA WITH LOWER URINARY TRACT SYMPTOMS, SYMPTOM DETAILS UNSPECIFIED: ICD-10-CM

## 2024-07-02 DIAGNOSIS — E03.9 HYPOTHYROIDISM, UNSPECIFIED TYPE: ICD-10-CM

## 2024-07-02 LAB
ALBUMIN SERPL-MCNC: 4 G/DL (ref 3.4–5)
ALBUMIN/GLOB SERPL: 1.6 {RATIO} (ref 1.1–2.2)
ALP SERPL-CCNC: 115 U/L (ref 40–129)
ALT SERPL-CCNC: 14 U/L (ref 10–40)
ANION GAP SERPL CALCULATED.3IONS-SCNC: 11 MMOL/L (ref 3–16)
AST SERPL-CCNC: 17 U/L (ref 15–37)
BILIRUB SERPL-MCNC: 0.5 MG/DL (ref 0–1)
BUN SERPL-MCNC: 18 MG/DL (ref 7–20)
CALCIUM SERPL-MCNC: 9.5 MG/DL (ref 8.3–10.6)
CHLORIDE SERPL-SCNC: 109 MMOL/L (ref 99–110)
CHOLEST SERPL-MCNC: 145 MG/DL (ref 0–199)
CO2 SERPL-SCNC: 23 MMOL/L (ref 21–32)
CREAT SERPL-MCNC: 1.3 MG/DL (ref 0.8–1.3)
GFR SERPLBLD CREATININE-BSD FMLA CKD-EPI: 62 ML/MIN/{1.73_M2}
GLUCOSE SERPL-MCNC: 93 MG/DL (ref 70–99)
HDLC SERPL-MCNC: 35 MG/DL (ref 40–60)
LDLC SERPL CALC-MCNC: 62 MG/DL
POTASSIUM SERPL-SCNC: 4.3 MMOL/L (ref 3.5–5.1)
PROT SERPL-MCNC: 6.5 G/DL (ref 6.4–8.2)
PSA SERPL DL<=0.01 NG/ML-MCNC: 9.34 NG/ML (ref 0–4)
SODIUM SERPL-SCNC: 143 MMOL/L (ref 136–145)
TRIGL SERPL-MCNC: 239 MG/DL (ref 0–150)
TSH SERPL DL<=0.005 MIU/L-ACNC: 0.42 UIU/ML (ref 0.27–4.2)
VLDLC SERPL CALC-MCNC: 48 MG/DL

## 2024-07-02 ASSESSMENT — PATIENT HEALTH QUESTIONNAIRE - PHQ9
SUM OF ALL RESPONSES TO PHQ9 QUESTIONS 1 & 2: 0
1. LITTLE INTEREST OR PLEASURE IN DOING THINGS: NOT AT ALL
SUM OF ALL RESPONSES TO PHQ QUESTIONS 1-9: 0
2. FEELING DOWN, DEPRESSED OR HOPELESS: NOT AT ALL
SUM OF ALL RESPONSES TO PHQ9 QUESTIONS 1 & 2: 0
SUM OF ALL RESPONSES TO PHQ QUESTIONS 1-9: 0
1. LITTLE INTEREST OR PLEASURE IN DOING THINGS: NOT AT ALL
2. FEELING DOWN, DEPRESSED OR HOPELESS: NOT AT ALL

## 2024-07-02 NOTE — PROGRESS NOTES
year (around 7/3/2025) for Well, 30 min.         Portions of Note per  Beulah Otto CMA AAMA with corrections and edits per Jessica Nielson MD.  I agree with entirety of note and was present and performed history and physical.  I also confirm that the note above accurately reflects all work, treatment, procedures, and medical decision making performed by me, Jessica Nielson MD

## 2024-07-03 ENCOUNTER — OFFICE VISIT (OUTPATIENT)
Dept: FAMILY MEDICINE CLINIC | Age: 64
End: 2024-07-03

## 2024-07-03 VITALS
HEART RATE: 53 BPM | BODY MASS INDEX: 39.6 KG/M2 | OXYGEN SATURATION: 98 % | TEMPERATURE: 97.2 F | HEIGHT: 69 IN | WEIGHT: 267.4 LBS | SYSTOLIC BLOOD PRESSURE: 112 MMHG | DIASTOLIC BLOOD PRESSURE: 82 MMHG

## 2024-07-03 DIAGNOSIS — K63.5 POLYP OF COLON, UNSPECIFIED PART OF COLON, UNSPECIFIED TYPE: ICD-10-CM

## 2024-07-03 DIAGNOSIS — Z00.00 WELL ADULT EXAM: Primary | ICD-10-CM

## 2024-07-03 DIAGNOSIS — R97.20 ELEVATED PSA: ICD-10-CM

## 2024-07-03 DIAGNOSIS — E78.5 HYPERLIPIDEMIA, UNSPECIFIED HYPERLIPIDEMIA TYPE: ICD-10-CM

## 2024-07-03 DIAGNOSIS — E03.9 HYPOTHYROIDISM, UNSPECIFIED TYPE: ICD-10-CM

## 2024-07-03 DIAGNOSIS — G43.809 VESTIBULAR MIGRAINE: ICD-10-CM

## 2024-07-03 RX ORDER — ATORVASTATIN CALCIUM 40 MG/1
40 TABLET, FILM COATED ORAL DAILY
Qty: 90 TABLET | Refills: 3 | Status: SHIPPED | OUTPATIENT
Start: 2024-07-03

## 2024-07-03 RX ORDER — PROPRANOLOL HCL 60 MG
60 CAPSULE, EXTENDED RELEASE 24HR ORAL DAILY
Qty: 90 CAPSULE | Refills: 3 | Status: SHIPPED | OUTPATIENT
Start: 2024-07-03

## 2024-07-03 RX ORDER — ONDANSETRON 4 MG/1
4 TABLET, FILM COATED ORAL 3 TIMES DAILY PRN
Qty: 180 TABLET | Refills: 3 | Status: SHIPPED | OUTPATIENT
Start: 2024-07-03

## 2024-07-03 RX ORDER — HYDROXYZINE PAMOATE 50 MG/1
50 CAPSULE ORAL 3 TIMES DAILY PRN
Qty: 270 CAPSULE | Refills: 3 | Status: SHIPPED | OUTPATIENT
Start: 2024-07-03

## 2024-07-03 RX ORDER — MAGNESIUM GLUCONATE 27 MG(500)
500 TABLET ORAL 2 TIMES DAILY
COMMUNITY

## 2024-07-03 RX ORDER — BUSPIRONE HYDROCHLORIDE 10 MG/1
10 TABLET ORAL 3 TIMES DAILY
Qty: 270 TABLET | Refills: 3 | Status: SHIPPED | OUTPATIENT
Start: 2024-07-03

## 2024-07-03 RX ORDER — CETIRIZINE HYDROCHLORIDE 10 MG/1
10 CAPSULE, LIQUID FILLED ORAL DAILY PRN
Qty: 90 CAPSULE | Refills: 3 | Status: SHIPPED | OUTPATIENT
Start: 2024-07-03

## 2024-07-03 RX ORDER — MONTELUKAST SODIUM 10 MG/1
10 TABLET ORAL NIGHTLY
Qty: 90 TABLET | Refills: 3 | Status: SHIPPED | OUTPATIENT
Start: 2024-07-03

## 2024-07-03 SDOH — ECONOMIC STABILITY: FOOD INSECURITY: WITHIN THE PAST 12 MONTHS, YOU WORRIED THAT YOUR FOOD WOULD RUN OUT BEFORE YOU GOT MONEY TO BUY MORE.: NEVER TRUE

## 2024-07-03 SDOH — ECONOMIC STABILITY: FOOD INSECURITY: WITHIN THE PAST 12 MONTHS, THE FOOD YOU BOUGHT JUST DIDN'T LAST AND YOU DIDN'T HAVE MONEY TO GET MORE.: NEVER TRUE

## 2024-07-03 SDOH — ECONOMIC STABILITY: INCOME INSECURITY: HOW HARD IS IT FOR YOU TO PAY FOR THE VERY BASICS LIKE FOOD, HOUSING, MEDICAL CARE, AND HEATING?: NOT HARD AT ALL

## 2024-07-03 NOTE — PATIENT INSTRUCTIONS
--Get your Covid vaccine this fall.      --Make sure you get your flu shot this fall. If you are over 65 look for the \"high dose\" flu shot for better protection.     --Check with pharmacy about getting the shingles vaccine, Shingrix (not Zostavax)      --Make appointment to see the dentist

## 2024-07-11 ENCOUNTER — TELEPHONE (OUTPATIENT)
Dept: FAMILY MEDICINE CLINIC | Age: 64
End: 2024-07-11

## 2024-07-11 DIAGNOSIS — K63.5 POLYP OF COLON, UNSPECIFIED PART OF COLON, UNSPECIFIED TYPE: Primary | ICD-10-CM

## 2024-07-11 NOTE — TELEPHONE ENCOUNTER
Let pt know we reached out to GI - Dr. Pereira and the last scope he had was an EGD in 2021. They didn't have a colonoscopy done at that time so he is due. Referral placed. Pt should reach out to them to schedule

## 2024-08-29 NOTE — LETTER
[address] [place] [address]  Phone: [phone]  Fax: [address], [place]        February 3, 2023     Patient: Lindsey Anderson   YOB: 1960           To Whom it May Concern:    I am writing to you in regard to my patient Lindsey Anderson. He suffers from vestibular migraines. Please allow him to work from home when he has a flare up of his vestibular migraines. When he has an episode he struggles with pain, sensory inputs, balance and nausea and it is better for him to work from home where he can avoid driving and manage external stimulation. He may need this up to three times a week. If you have any questions or concerns, please don't hesitate to call.     Sincerely,         Keke Richardson MD Noted, thank you!

## 2024-09-09 RX ORDER — MECLIZINE HYDROCHLORIDE 25 MG/1
25 TABLET ORAL 3 TIMES DAILY PRN
Qty: 270 TABLET | Refills: 3 | Status: SHIPPED | OUTPATIENT
Start: 2024-09-09

## 2024-12-05 RX ORDER — RIZATRIPTAN BENZOATE 10 MG/1
TABLET, ORALLY DISINTEGRATING ORAL
Qty: 30 TABLET | Refills: 2 | Status: SHIPPED | OUTPATIENT
Start: 2024-12-05

## 2024-12-05 RX ORDER — LEVOTHYROXINE SODIUM 125 UG/1
125 TABLET ORAL DAILY
Qty: 90 TABLET | Refills: 1 | Status: SHIPPED | OUTPATIENT
Start: 2024-12-05

## 2024-12-05 RX ORDER — FLUTICASONE PROPIONATE 50 MCG
2 SPRAY, SUSPENSION (ML) NASAL DAILY
Qty: 3 EACH | Refills: 3 | Status: SHIPPED | OUTPATIENT
Start: 2024-12-05

## 2025-04-14 RX ORDER — ONDANSETRON 4 MG/1
4 TABLET, FILM COATED ORAL 3 TIMES DAILY PRN
Qty: 180 TABLET | Refills: 0 | Status: SHIPPED | OUTPATIENT
Start: 2025-04-14

## 2025-06-03 RX ORDER — LEVOTHYROXINE SODIUM 125 UG/1
125 TABLET ORAL DAILY
Qty: 90 TABLET | Refills: 0 | Status: SHIPPED | OUTPATIENT
Start: 2025-06-03

## 2025-06-03 NOTE — TELEPHONE ENCOUNTER
Medication:   Requested Prescriptions     Pending Prescriptions Disp Refills    levothyroxine (SYNTHROID) 125 MCG tablet 90 tablet 1     Sig: Take 1 tablet by mouth daily      Last Filled:      Patient Phone Number: [phone] (home)     Last appt: 7/3/2024   Next appt: Visit date not found    Last OARRS:        No data to display              PDMP Monitoring:    Last PDMP Dannie as Reviewed ([place]):  Review User Review Instant Review Result          Preferred Pharmacy:   [facility] - [place], [place] - [address] -  [phone] - F [phone]  [address]  [place] [address]  Phone: [phone] Fax: [phone]

## 2025-07-18 DIAGNOSIS — Z12.5 SCREENING PSA (PROSTATE SPECIFIC ANTIGEN): ICD-10-CM

## 2025-07-18 DIAGNOSIS — E03.9 HYPOTHYROIDISM, UNSPECIFIED TYPE: Primary | ICD-10-CM

## 2025-07-18 DIAGNOSIS — E78.5 HYPERLIPIDEMIA, UNSPECIFIED HYPERLIPIDEMIA TYPE: ICD-10-CM

## 2025-07-24 RX ORDER — BUSPIRONE HYDROCHLORIDE 10 MG/1
10 TABLET ORAL 3 TIMES DAILY
Qty: 270 TABLET | Refills: 0 | Status: SHIPPED | OUTPATIENT
Start: 2025-07-24

## 2025-07-24 RX ORDER — FLUTICASONE PROPIONATE 50 MCG
2 SPRAY, SUSPENSION (ML) NASAL DAILY
Qty: 3 EACH | Refills: 0 | Status: SHIPPED | OUTPATIENT
Start: 2025-07-24

## 2025-07-24 RX ORDER — MECLIZINE HYDROCHLORIDE 25 MG/1
25 TABLET ORAL 3 TIMES DAILY PRN
Qty: 270 TABLET | Refills: 0 | Status: SHIPPED | OUTPATIENT
Start: 2025-07-24

## 2025-07-24 RX ORDER — MONTELUKAST SODIUM 10 MG/1
10 TABLET ORAL NIGHTLY
Qty: 90 TABLET | Refills: 0 | Status: SHIPPED | OUTPATIENT
Start: 2025-07-24

## 2025-07-24 RX ORDER — PROPRANOLOL HYDROCHLORIDE 60 MG/1
60 CAPSULE, EXTENDED RELEASE ORAL DAILY
Qty: 90 CAPSULE | Refills: 0 | Status: SHIPPED | OUTPATIENT
Start: 2025-07-24

## 2025-07-24 RX ORDER — ESCITALOPRAM OXALATE 10 MG/1
TABLET ORAL
Qty: 90 TABLET | Refills: 0 | Status: SHIPPED | OUTPATIENT
Start: 2025-07-24

## 2025-07-24 RX ORDER — ONDANSETRON 4 MG/1
4 TABLET, FILM COATED ORAL 3 TIMES DAILY PRN
Qty: 180 TABLET | Refills: 0 | Status: SHIPPED | OUTPATIENT
Start: 2025-07-24

## 2025-07-24 RX ORDER — ATORVASTATIN CALCIUM 40 MG/1
40 TABLET, FILM COATED ORAL DAILY
Qty: 90 TABLET | Refills: 0 | Status: SHIPPED | OUTPATIENT
Start: 2025-07-24

## 2025-07-30 DIAGNOSIS — E03.9 HYPOTHYROIDISM, UNSPECIFIED TYPE: ICD-10-CM

## 2025-07-30 DIAGNOSIS — E78.5 HYPERLIPIDEMIA, UNSPECIFIED HYPERLIPIDEMIA TYPE: ICD-10-CM

## 2025-07-30 DIAGNOSIS — Z12.5 SCREENING PSA (PROSTATE SPECIFIC ANTIGEN): ICD-10-CM

## 2025-07-31 LAB
ALBUMIN SERPL-MCNC: 4.1 G/DL (ref 3.4–5)
ALBUMIN/GLOB SERPL: 1.6 {RATIO} (ref 1.1–2.2)
ALP SERPL-CCNC: 115 U/L (ref 40–129)
ALT SERPL-CCNC: 21 U/L (ref 10–40)
ANION GAP SERPL CALCULATED.3IONS-SCNC: 11 MMOL/L (ref 3–16)
AST SERPL-CCNC: 21 U/L (ref 15–37)
BILIRUB SERPL-MCNC: 0.5 MG/DL (ref 0–1)
BUN SERPL-MCNC: 16 MG/DL (ref 7–20)
CALCIUM SERPL-MCNC: 9.5 MG/DL (ref 8.3–10.6)
CHLORIDE SERPL-SCNC: 106 MMOL/L (ref 99–110)
CHOLEST SERPL-MCNC: 135 MG/DL (ref 0–199)
CO2 SERPL-SCNC: 24 MMOL/L (ref 21–32)
CREAT SERPL-MCNC: 1.2 MG/DL (ref 0.8–1.3)
GFR SERPLBLD CREATININE-BSD FMLA CKD-EPI: 67 ML/MIN/{1.73_M2}
GLUCOSE SERPL-MCNC: 93 MG/DL (ref 70–99)
HDLC SERPL-MCNC: 37 MG/DL (ref 40–60)
LDLC SERPL CALC-MCNC: 77 MG/DL
POTASSIUM SERPL-SCNC: 4.9 MMOL/L (ref 3.5–5.1)
PROT SERPL-MCNC: 6.7 G/DL (ref 6.4–8.2)
PSA SERPL DL<=0.01 NG/ML-MCNC: 10.3 NG/ML (ref 0–4)
SODIUM SERPL-SCNC: 141 MMOL/L (ref 136–145)
T4 FREE SERPL-MCNC: 1.8 NG/DL (ref 0.9–1.8)
TRIGL SERPL-MCNC: 106 MG/DL (ref 0–150)
TSH SERPL DL<=0.005 MIU/L-ACNC: 0.13 UIU/ML (ref 0.27–4.2)
VLDLC SERPL CALC-MCNC: 21 MG/DL

## 2025-07-31 ASSESSMENT — PATIENT HEALTH QUESTIONNAIRE - PHQ9
SUM OF ALL RESPONSES TO PHQ9 QUESTIONS 1 & 2: 0
SUM OF ALL RESPONSES TO PHQ QUESTIONS 1-9: 0
1. LITTLE INTEREST OR PLEASURE IN DOING THINGS: NOT AT ALL
2. FEELING DOWN, DEPRESSED OR HOPELESS: NOT AT ALL
SUM OF ALL RESPONSES TO PHQ QUESTIONS 1-9: 0
SUM OF ALL RESPONSES TO PHQ QUESTIONS 1-9: 0
2. FEELING DOWN, DEPRESSED OR HOPELESS: NOT AT ALL
1. LITTLE INTEREST OR PLEASURE IN DOING THINGS: NOT AT ALL
SUM OF ALL RESPONSES TO PHQ QUESTIONS 1-9: 0

## 2025-08-04 ENCOUNTER — TELEPHONE (OUTPATIENT)
Dept: FAMILY MEDICINE CLINIC | Age: 65
End: 2025-08-04

## 2025-08-04 ENCOUNTER — OFFICE VISIT (OUTPATIENT)
Dept: FAMILY MEDICINE CLINIC | Age: 65
End: 2025-08-04
Payer: OTHER GOVERNMENT

## 2025-08-04 VITALS
DIASTOLIC BLOOD PRESSURE: 74 MMHG | HEIGHT: 69 IN | OXYGEN SATURATION: 98 % | HEART RATE: 54 BPM | WEIGHT: 271.4 LBS | SYSTOLIC BLOOD PRESSURE: 112 MMHG | BODY MASS INDEX: 40.2 KG/M2 | TEMPERATURE: 97.5 F

## 2025-08-04 DIAGNOSIS — R97.20 ELEVATED PSA: ICD-10-CM

## 2025-08-04 DIAGNOSIS — Z00.00 WELL ADULT EXAM: Primary | ICD-10-CM

## 2025-08-04 DIAGNOSIS — F41.9 ANXIETY: ICD-10-CM

## 2025-08-04 DIAGNOSIS — E78.5 HYPERLIPIDEMIA, UNSPECIFIED HYPERLIPIDEMIA TYPE: ICD-10-CM

## 2025-08-04 DIAGNOSIS — E03.9 HYPOTHYROIDISM, UNSPECIFIED TYPE: ICD-10-CM

## 2025-08-04 DIAGNOSIS — G43.809 VESTIBULAR MIGRAINE: ICD-10-CM

## 2025-08-04 DIAGNOSIS — K21.9 GASTROESOPHAGEAL REFLUX DISEASE, UNSPECIFIED WHETHER ESOPHAGITIS PRESENT: ICD-10-CM

## 2025-08-04 PROCEDURE — 99214 OFFICE O/P EST MOD 30 MIN: CPT | Performed by: FAMILY MEDICINE

## 2025-08-04 PROCEDURE — 99396 PREV VISIT EST AGE 40-64: CPT | Performed by: FAMILY MEDICINE

## 2025-08-04 RX ORDER — ONDANSETRON 4 MG/1
4 TABLET, FILM COATED ORAL 3 TIMES DAILY PRN
Qty: 180 TABLET | Refills: 3 | Status: SHIPPED | OUTPATIENT
Start: 2025-08-04

## 2025-08-04 RX ORDER — ESCITALOPRAM OXALATE 10 MG/1
TABLET ORAL
Qty: 90 TABLET | Refills: 3 | Status: SHIPPED | OUTPATIENT
Start: 2025-08-04

## 2025-08-04 RX ORDER — BUSPIRONE HYDROCHLORIDE 15 MG/1
15 TABLET ORAL 3 TIMES DAILY
Qty: 270 TABLET | Refills: 3 | Status: SHIPPED | OUTPATIENT
Start: 2025-08-04

## 2025-08-04 RX ORDER — RIBOFLAVIN (VITAMIN B2) 400 MG
400 TABLET ORAL DAILY
COMMUNITY
Start: 2025-08-04

## 2025-08-04 RX ORDER — MONTELUKAST SODIUM 10 MG/1
10 TABLET ORAL NIGHTLY
Qty: 90 TABLET | Refills: 3 | Status: SHIPPED | OUTPATIENT
Start: 2025-08-04

## 2025-08-04 RX ORDER — PROPRANOLOL HYDROCHLORIDE 60 MG/1
60 CAPSULE, EXTENDED RELEASE ORAL DAILY
Qty: 90 CAPSULE | Refills: 3 | Status: SHIPPED | OUTPATIENT
Start: 2025-08-04

## 2025-08-04 RX ORDER — HYDROXYZINE PAMOATE 50 MG/1
50 CAPSULE ORAL 3 TIMES DAILY PRN
Qty: 270 CAPSULE | Refills: 3 | Status: SHIPPED | OUTPATIENT
Start: 2025-08-04

## 2025-08-04 RX ORDER — ATORVASTATIN CALCIUM 40 MG/1
40 TABLET, FILM COATED ORAL DAILY
Qty: 90 TABLET | Refills: 3 | Status: SHIPPED | OUTPATIENT
Start: 2025-08-04

## 2025-08-04 SDOH — ECONOMIC STABILITY: FOOD INSECURITY: WITHIN THE PAST 12 MONTHS, THE FOOD YOU BOUGHT JUST DIDN'T LAST AND YOU DIDN'T HAVE MONEY TO GET MORE.: NEVER TRUE

## 2025-08-04 SDOH — ECONOMIC STABILITY: FOOD INSECURITY: WITHIN THE PAST 12 MONTHS, YOU WORRIED THAT YOUR FOOD WOULD RUN OUT BEFORE YOU GOT MONEY TO BUY MORE.: NEVER TRUE

## 2025-09-04 RX ORDER — LEVOTHYROXINE SODIUM 125 UG/1
125 TABLET ORAL DAILY
Qty: 90 TABLET | Refills: 2 | Status: SHIPPED | OUTPATIENT
Start: 2025-09-04